# Patient Record
Sex: MALE | Employment: FULL TIME | URBAN - METROPOLITAN AREA
[De-identification: names, ages, dates, MRNs, and addresses within clinical notes are randomized per-mention and may not be internally consistent; named-entity substitution may affect disease eponyms.]

---

## 2018-04-09 ENCOUNTER — TRANSFERRED RECORDS (OUTPATIENT)
Dept: HEALTH INFORMATION MANAGEMENT | Facility: CLINIC | Age: 33
End: 2018-04-09

## 2018-04-18 ENCOUNTER — PRE VISIT (OUTPATIENT)
Dept: UROLOGY | Facility: CLINIC | Age: 33
End: 2018-04-18

## 2018-04-18 NOTE — TELEPHONE ENCOUNTER
MEDICAL RECORDS REQUEST   [facility]  Urology Clinic  [address] [place] [address]  PHONE: [phone]  Fax: [phone]        FUTURE VISIT INFORMATION                                                   Biju Chowdhury, : 1985 scheduled for future visit at [facility]    APPOINTMENT INFORMATION:    Date: 2018    Provider:  CORIN NAIR    Reason for Visit/Diagnosis: INFERTILITY    REFERRAL INFORMATION:    Referring provider:  [facility]    Specialty: UROLOGY    Referring providers clinic[facility]  [facility]    Clinic contact number:  [phone]    RECORDS REQUESTED FOR VISIT                                                     NOTES  STATUS/DETAILS   OFFICE NOTE from referring provider  in process   OFFICE NOTE from other specialist  in process   DISCHARGE SUMMARY from hospital  no   DISCHARGE REPORT from the ER  no   OPERATIVE REPORT  no   MEDICATION LIST  in process   INFERTILITY     ALBUMIN  in process   FSH  in process   LAST UROLOGY/OB GYN VISIT NOTE  in process   LH  in process   SEMEN ANALYSIS (LAST 2)  in process   SHBG  in process   T  in process       PRE-VISIT CHECKLIST      Record collection complete If no, please explain IN PROCESS   SENT REQUEST [facility] UROLOGY.. CD   Appointment appropriately scheduled           (right time/right provider) Yes   Joint diagnostic appointment coordinated correctly          (ensure right order & amount of time) Yes   MyChart activation If no, please explain IN PROCESS   Questionnaire complete If no, please explain IN PROCESS     Completed by: Chitra Hartman

## 2018-04-19 ENCOUNTER — APPOINTMENT (OUTPATIENT)
Dept: LAB | Facility: CLINIC | Age: 33
End: 2018-04-19
Payer: COMMERCIAL

## 2018-04-19 ENCOUNTER — OFFICE VISIT (OUTPATIENT)
Dept: UROLOGY | Facility: CLINIC | Age: 33
End: 2018-04-19
Payer: COMMERCIAL

## 2018-04-19 VITALS
SYSTOLIC BLOOD PRESSURE: 113 MMHG | HEART RATE: 57 BPM | WEIGHT: 165 LBS | HEIGHT: 72 IN | BODY MASS INDEX: 22.35 KG/M2 | DIASTOLIC BLOOD PRESSURE: 74 MMHG

## 2018-04-19 DIAGNOSIS — E29.1 TESTICULAR HYPOFUNCTION: ICD-10-CM

## 2018-04-19 DIAGNOSIS — I86.1 VARICOCELE: ICD-10-CM

## 2018-04-19 DIAGNOSIS — N46.11 OLIGOSPERMIA: Primary | ICD-10-CM

## 2018-04-19 LAB — FSH SERPL-ACNC: 3 IU/L (ref 0.7–10.8)

## 2018-04-19 ASSESSMENT — PAIN SCALES - GENERAL: PAINLEVEL: NO PAIN (0)

## 2018-04-19 NOTE — PROGRESS NOTES
It was my pleasure to see Mr. Biju Chowdhury, a 33 year old male here in consultation today for fertility evaluation.      HARSH Chowdhury is a 33 year old male with no significant PMH.  He and his partner have been attempting to conceive for the last year.  They have no previous pregnancy together.  They both have pregnacies with other partners reported.  They have tried timed intercourse for 1 year, including using BBT and OPK. They have not tried IUI or IVF.  They do not use lubrication during intercourse.  No associated conditions such as ED or sexual dysfunction.     The patient's spouse, Sylvie, is 39 years old.  She is in good health.  She has been pregnant before and has a 14 year old son.  She has regular monthly menstrual cycles.  She has been evaluated for infertility and has had a normal work-up other than elevated TH 5.9.  Her testing includes HSG, and thyroid, and ultrasound, which is scheduled.    PAST MEDICAL HISTORY:    No chronic medical problems     Puberty normal   No associated conditions such as ED or sexual dysfunction.  No  problems.     PAST SURG HISTORY  History reviewed. No pertinent surgical history.     Medications as of 4/19/2018:  No prescription medications     ALLERGY:   No Known Allergies    SOCIAL HISTORY:  . Occupation: HVAC  No alcohol abuse, Never tobacco use.   Social History   Substance Use Topics     Smoking status: Never Smoker     Smokeless tobacco: Never Used     Alcohol use Not on file         FAMILY HISTORY:   Mother and uncle have MS    REVIEW OF SYSTEMS:  Answers for HPI/ROS submitted by the patient on 4/19/2018   General Symptoms: No  Skin Symptoms: No  HENT Symptoms: No  EYE SYMPTOMS: No  HEART SYMPTOMS: No  LUNG SYMPTOMS: No  INTESTINAL SYMPTOMS: No  URINARY SYMPTOMS: No  REPRODUCTIVE SYMPTOMS: No  SKELETAL SYMPTOMS: No  BLOOD SYMPTOMS: No  NERVOUS SYSTEM SYMPTOMS: No  MENTAL HEALTH SYMPTOMS: No    Otherwise, no constitutional, eye, ENT, heart, lung,  GI , musculoskeletal, skin, neurologic, psychiatric, or hematologic complaints.    GONADOTOXIN EXPOSURE: Unremarkable. Otherwise negative for marijuana, heat, chemicals, pesticides, heavy metals, steroids, chemotherapy or radiation.    GENERAL PHYSICAL EXAM  /74  Pulse 57  Ht 1.829 m (6')  Wt 74.8 kg (165 lb)  BMI 22.38 kg/m2   Constitutional: No acute distress. Well nourished.   PSYCH: normal mood and affect.  NEURO: normal gait, no focal deficits.   EYES: anicteric, EOMI, PERR  ENT: neck supple,  mucosae moist, no thrush.  CARDIOPULMONARY: breathing non-labored, pulse regular, no peripheral edema.  GI: Abdomen soft, non-tender, no surgical scars, no organomegaly.  MUSCULOSKELETAL: normal limb proportions, no muscle wasting, no contractures.  SKIN: Normal virilized hair distribution, no lesions, warts or rashes over genitalia, abdomen extremities or face.  HEME/LYMPH: no ecchymosis, no lymphadenopathy in groin or neck, no lymphedema.     EXAM:  Phallus circumcised, meatus adequate, no plaques palpated.   Left testis descended , size is 16-18 cc , consistency is normal . No intra-testicular masses.   Right testis descended , size is 16-18 cc , consistency is normal . No intra-testicular masses.   Epididymes present, non-tender, not enlarged.   Left cord: Vas present. Grade II  varicocele noted.  Right cord: Vas present. Probable small Grade II  varicocele noted.     Rectal exam deferred.     [facility]:  [facility]  Semen Analysis:  (normal range in parenthesis)   -Volume: 4.1 ml (1.5-5.0)   -pH: 7.6 (>7.2)   -Concentration: 6.4 million/ml (>15 million/ml)   -% Forward progressive: 60% (>30%)   -Total progressive motile count: 16.3million (>15.6 million)   -% Normal morphology: 0% (>4%)      ASSESSMENT:    Testicular hypofunction: Oligospermia, teratospermia.  Left varicocele, probable right varicocele.    PLAN:    Hormonal panel today.    Repeat SA     Scrotal ultrasound to confirm right varicocele or  not.  Discussed risks, benefits, and alternatives of varix repair, including various methods.    I counseled him extensively on the nature of varicoceles, and their possible effects on testosterone production and fertility.  I described surgery and embolization approaches, and the detailed risks of surgical repair.  These include damage to artery (ischemia), damage to lymphatics ( hydrocele), as well as risk of recurrence (</= 1%). Discussed that about 2/3rds of men see improved semen parameters and that improvement takes at least 3 months to see.        Discussed OTC supplements to consider taking    Dr. Mcbride will contact patient with results and plan/options    Patient was seen and examined with Dr. Reed Munoz, G2  Urology Resident    CC: Self    I saw and examined the patient with the resident today.  I agree with the resident note and plan of care as above.     Tez Mcbride MD  Urology Staff

## 2018-04-19 NOTE — NURSING NOTE
Chief Complaint   Patient presents with     Consult     fertility testing follow up       Blood pressure 113/74, pulse 57, height 1.829 m (6'), weight 74.8 kg (165 lb). Body mass index is 22.38 kg/(m^2).    There is no problem list on file for this patient.      No Known Allergies    No current outpatient prescriptions on file.       Social History   Substance Use Topics     Smoking status: Never Smoker     Smokeless tobacco: Never Used     Alcohol use No       Ninfa Lo LPN  4/19/2018  8:03 AM

## 2018-04-19 NOTE — PATIENT INSTRUCTIONS
Semen analysis.  Blood work today.  Schedule imaging.        It was a pleasure meeting with you today.  Thank you for allowing me and my team the privilege of caring for you today.  YOU are the reason we are here, and I truly hope we provided you with the excellent service you deserve.  Please let us know if there is anything else we can do for you so that we can be sure you are leaving completely satisfied with your care experience.

## 2018-04-19 NOTE — LETTER
4/19/2018       RE: Biju Chowdhury  [address] [place] [address]     Dear Colleague,    Thank you for referring your patient, Biju Chowdhury, to the [facility]OLOGY AND [facility]ST FOR PROSTATE AND UROLOGIC CANCERS at [facility]. Please see a copy of my visit note below.    It was my pleasure to see Mr. Biju Chowdhury, a 33 year old male here in consultation today for fertility evaluation.      HPI  Biju Chowdhury is a 33 year old male with no significant PMH.  He and his partner have been attempting to conceive for the last year.  They have no previous pregnancy together.  They both have pregnacies with other partners reported.  They have tried timed intercourse for 1 year, including using BBT and OPK. They have not tried IUI or IVF.  They do not use lubrication during intercourse.  No associated conditions such as ED or sexual dysfunction.     The patient's spouse, Sylvie, is 39 years old.  She is in good health.  She has been pregnant before and has a 14 year old son.  She has regular monthly menstrual cycles.  She has been evaluated for infertility and has had a normal work-up other than elevated TH 5.9.  Her testing includes HSG, and thyroid, and ultrasound, which is scheduled.    PAST MEDICAL HISTORY:    No chronic medical problems     Puberty normal   No associated conditions such as ED or sexual dysfunction.  No  problems.     PAST SURG HISTORY  History reviewed. No pertinent surgical history.     Medications as of 4/19/2018:  No prescription medications     ALLERGY:   No Known Allergies    SOCIAL HISTORY:  . Occupation: HVAC  No alcohol abuse, Never tobacco use.   Social History   Substance Use Topics     Smoking status: Never Smoker     Smokeless tobacco: Never Used     Alcohol use Not on file         FAMILY HISTORY:   Mother and uncle have MS    REVIEW OF SYSTEMS:    Otherwise, no constitutional, eye, ENT, heart, lung, GI , musculoskeletal, skin,  neurologic, psychiatric, or hematologic complaints.    GONADOTOXIN EXPOSURE: Unremarkable. Otherwise negative for marijuana, heat, chemicals, pesticides, heavy metals, steroids, chemotherapy or radiation.    GENERAL PHYSICAL EXAM  /74  Pulse 57  Ht 1.829 m (6')  Wt 74.8 kg (165 lb)  BMI 22.38 kg/m2   Constitutional: No acute distress. Well nourished.   PSYCH: normal mood and affect.  NEURO: normal gait, no focal deficits.   EYES: anicteric, EOMI, PERR  ENT: neck supple,  mucosae moist, no thrush.  CARDIOPULMONARY: breathing non-labored, pulse regular, no peripheral edema.  GI: Abdomen soft, non-tender, no surgical scars, no organomegaly.  MUSCULOSKELETAL: normal limb proportions, no muscle wasting, no contractures.  SKIN: Normal virilized hair distribution, no lesions, warts or rashes over genitalia, abdomen extremities or face.  HEME/LYMPH: no ecchymosis, no lymphadenopathy in groin or neck, no lymphedema.     EXAM:  Phallus circumcised, meatus adequate, no plaques palpated.   Left testis descended , size is 16-18 cc , consistency is normal . No intra-testicular masses.   Right testis descended , size is 16-18 cc , consistency is normal . No intra-testicular masses.   Epididymes present, non-tender, not enlarged.   Left cord: Vas present. Grade II  varicocele noted.  Right cord: Vas present. Probable small Grade II  varicocele noted.     Rectal exam deferred.     [facility]  [facility]  Semen Analysis:  (normal range in parenthesis)   -Volume: 4.1 ml (1.5-5.0)   -pH: 7.6 (>7.2)   -Concentration: 6.4 million/ml (>15 million/ml)   -% Forward progressive: 60% (>30%)   -Total progressive motile count: 16.3million (>15.6 million)   -% Normal morphology: 0% (>4%)      ASSESSMENT:    Testicular hypofunction: Oligospermia, teratospermia.  Left varicocele, probable right varicocele.    PLAN:    Hormonal panel today.    Repeat SA     Scrotal ultrasound to confirm right varicocele or not.  Discussed risks,  benefits, and alternatives of varix repair, including various methods.    I counseled him extensively on the nature of varicoceles, and their possible effects on testosterone production and fertility.  I described surgery and embolization approaches, and the detailed risks of surgical repair.  These include damage to artery (ischemia), damage to lymphatics ( hydrocele), as well as risk of recurrence (</= 1%). Discussed that about 2/3rds of men see improved semen parameters and that improvement takes at least 3 months to see.        Discussed OTC supplements to consider taking    Dr. Mcbride will contact patient with results and plan/options    Patient was seen and examined with Dr. Reed Munoz, G2  Urology Resident    I saw and examined the patient with the resident today.  I agree with the resident note and plan of care as above.     Again, thank you for allowing me to participate in the care of your patient.      Sincerely,    Tez Mcbride MD    CC: Self

## 2018-04-19 NOTE — MR AVS SNAPSHOT
After Visit Summary   4/19/2018    Biju Chowdhury    MRN: [id number]           Patient Information     Date Of Birth          1985        Visit Information        Provider Department      4/19/2018 7:40 AM Tez Mcbride MD [facility]        Today's Diagnoses     Oligospermia    -  1    Testicular hypofunction          Care Instructions    Semen analysis.  Blood work today.  Schedule imaging.        It was a pleasure meeting with you today.  Thank you for allowing me and my team the privilege of caring for you today.  YOU are the reason we are here, and I truly hope we provided you with the excellent service you deserve.  Please let us know if there is anything else we can do for you so that we can be sure you are leaving completely satisfied with your care experience.                  Follow-ups after your visit        Your next 10 appointments already scheduled     Apr 19, 2018  9:15 AM CDT   LAB with [facility] ([facility])    [address] [place] [id number] [phone]           Please do not eat 10-12 hours before your appointment if you are coming in fasting for labs on lipids, cholesterol, or glucose (sugar). This does not apply to pregnant women. Water, hot tea and black coffee (with nothing added) are okay. Do not drink other fluids, diet soda or chew gum.              Future tests that were ordered for you today     Open Future Orders        Priority Expected Expires Ordered    US Testicular and Scrotum Routine  4/19/2019 4/19/2018    Semen Analysis, Strict Morphology (KATHLEEN) Routine  4/20/2019 4/19/2018            Who to contact     Please call your clinic at [phone] to:    Ask questions about your health    Make or cancel appointments    Discuss your medicines    Learn about your test results    Speak to your doctor            Additional Information About Your  Visit        [employer] Information     [employer] is an electronic gateway that provides easy, online access to your medical records. With [employer], you can request a clinic appointment, read your test results, renew a prescription or communicate with your care team.     To sign up for [employer] visit the website at [email or web address]   You will be asked to enter the access code listed below, as well as some personal information. Please follow the directions to create your username and password.     Your access code is: [email or web address]  Expires: 2018  6:30 AM     Your access code will  in 90 days. If you need help or a new code, please contact your [facility] or call [phone] for assistance.        Care EveryWhere ID     This is your Care EveryWhere ID. This could be used by other organizations to access your [facility] medical records  [id number]        Your Vitals Were     Pulse Height BMI (Body Mass Index)             57 1.829 m (6') 22.38 kg/m2          Blood Pressure from Last 3 Encounters:   18 113/74    Weight from Last 3 Encounters:   18 74.8 kg (165 lb)              We Performed the Following     Estradiol ultrasensitive     Follicle stimulating hormone     Testosterone Free and Total        Primary Care Provider Fax #    Physician No Ref-Primary [phone]       No address on file        Equal Access to Services     FREDA MARQUIS : Hadii gerald mcfarlaneo Sojeremiah, waaxda luqadaha, qaybta kaalmada adeegyada, kurt charles . So [facility] [phone].    ATENCIÓN: Si habla español, tiene a covarrubias disposición servicios gratuitos de asistencia lingüística. Llame al [phone].    We comply with applicable federal civil rights laws and [place] laws. We do not discriminate on the basis of race, color, national origin, age, disability, sex, sexual orientation, or gender identity.            Thank you!     Thank you for choosing [facility] PR[facility]ATE AND UROLOGIC CANCERS  for your care. Our goal is always to provide you with excellent care. Hearing back from our patients is one way we can continue to improve our services. Please take a few minutes to complete the written survey that you may receive in the mail after your visit with us. Thank you!             Your Updated Medication List - Protect others around you: Learn how to safely use, store and throw away your medicines at www.disposemymeds.org.      Notice  As of 4/19/2018  8:45 AM    You have not been prescribed any medications.

## 2018-04-19 NOTE — NURSING NOTE
Chief Complaint   Patient presents with     Consult     fertility testing follow up       Blood pressure 113/74, pulse 57, height 1.829 m (6'), weight 74.8 kg (165 lb). Body mass index is 22.38 kg/(m^2).    There is no problem list on file for this patient.      No Known Allergies    No current outpatient prescriptions on file.       Social History   Substance Use Topics     Smoking status: Never Smoker     Smokeless tobacco: Never Used     Alcohol use Not on file       Ninfa Lo LPN  4/19/2018  7:56 AM

## 2018-04-20 LAB
SHBG SERPL-SCNC: 50 NMOL/L (ref 11–80)
TESTOST FREE SERPL-MCNC: 10.07 NG/DL (ref 4.7–24.4)
TESTOST SERPL-MCNC: 610 NG/DL (ref 240–950)

## 2018-04-24 LAB — ESTRADIOL SERPL HS-MCNC: 25 PG/ML (ref 10–40)

## 2020-08-12 ENCOUNTER — TELEPHONE (OUTPATIENT)
Dept: UROLOGY | Facility: CLINIC | Age: 35
End: 2020-08-12

## 2020-08-12 NOTE — TELEPHONE ENCOUNTER
[facility] Call Center    Phone Message    May a detailed message be left on voicemail: yes     Reason for Call: Other: pt wife Sylvie calling to see what is the steps needed for fertility? They are ready for surgery and would like to discuss the recommendations from Dr Mcbride again. Please advise.      Action Taken: Message routed to:  [facility] urology    Travel Screening: Not Applicable

## 2020-08-14 NOTE — TELEPHONE ENCOUNTER
Spoke with wife and they wanted to proceed with what was recommended two years ago. I let them know the Dr wants to have a visit since it has been two years. Phone call scheduled for 9/17 9:15am

## 2020-09-10 ENCOUNTER — PRE VISIT (OUTPATIENT)
Dept: UROLOGY | Facility: CLINIC | Age: 35
End: 2020-09-10

## 2020-09-10 NOTE — TELEPHONE ENCOUNTER
Reason for Visit: Follow up discuss options, last seen 2018    Diagnosis: Oligospermia    Orders/Procedures/Records: in system    Contact Patient: n/a    Rooming Requirements: normal      Ninfa Lo LPN  09/10/20  8:48 AM

## 2020-09-17 ENCOUNTER — VIRTUAL VISIT (OUTPATIENT)
Dept: UROLOGY | Facility: CLINIC | Age: 35
End: 2020-09-17
Payer: COMMERCIAL

## 2020-09-17 DIAGNOSIS — N46.11 OLIGOSPERMIA: Primary | ICD-10-CM

## 2020-09-17 NOTE — PROGRESS NOTES
"Biju Chowdhury is a 35 year old male who is being evaluated via a billable telephone visit.      The patient has been notified of following:     \"This telephone visit will be conducted via a call between you and your physician/provider. We have found that certain health care needs can be provided without the need for a physical exam.  This service lets us provide the care you need with a short phone conversation.  If a prescription is necessary we can send it directly to your pharmacy.  If lab work is needed we can place an order for that and you can then stop by our lab to have the test done at a later time.    Telephone visits are billed at different rates depending on your insurance coverage. During this emergency period, for some insurers they may be billed the same as an in-person visit.  Please reach out to your insurance provider with any questions.    If during the course of the call the physician/provider feels a telephone visit is not appropriate, you will not be charged for this service.\"    Patient has given verbal consent for Telephone visit?  Yes    What phone number would you like to be contacted at? [phone]    How would you like to obtain your AVS? Mail a copy    Phone call duration: 18 minutes, ending 9:38 AM         Last seen 2.5 years ago.  Discussed scrotal ultrasound and possible varicocele repair at this time.  He tried natural supplements since I last saw him.  About 1yr ago started over-the-counter MVI but no consistently.  Finances are a concern.    Component      Latest Ref Rng & Units 4/19/2018   Testosterone Total      240 - 950 ng/dL 610   Sex Hormone Binding Globulin      11 - 80 nmol/L 50   Free Testosterone Calculated      4.7 - 24.4 ng/dL 10.07   FSH      0.7 - 10.8 IU/L 3.0   Estradiol Ultrasensitive      10 - 40 pg/mL 25     His partner Sylvie is 41/42 years old.      Discussed that female fertility is suspect due to her advancing age.  Sounds like they did 1-2 IUIs in the " past.    Discussed that varicocele repair would be the main fixable thing on his side of things.  I would want to a scrotal ultrasound to determine right varicocele or not, prior to treatment.  He would like to know cost- I said it depends if left or bilateral varicocele.  Embolization is more expensive.  They are saving money in a cookie jar to go towards fertility treatment at this time.    He understands her age is going to be an increasing factor with time.    [facility]  Semen Analysis:  (normal range in parenthesis)               -Volume: 4.1 ml (1.5-5.0)               -pH: 7.6 (>7.2)               -Concentration: 6.4 million/ml (>15 million/ml)               -% Forward progressive: 60% (>30%)               -Total progressive motile count: 16.3million (>15.6 million)               -% Normal morphology: 0% (>4%)    A-  Left and possible right varicocele.  Normal hormone profile 2018.  Oligospermia     Plan  -  Recommended scrotal ultrasound as a next step.  - he will think about the options and let us know when he wants to proceed.

## 2020-09-17 NOTE — NURSING NOTE
Chief Complaint   Patient presents with     Follow Up     Oligospermia     There is no problem list on file for this patient.      No Known Allergies    No current outpatient medications on file.       Social History     Tobacco Use     Smoking status: Never Smoker     Smokeless tobacco: Never Used   Substance Use Topics     Alcohol use: No     Drug use: No       Ninfa Lo LPN  9/17/2020  8:43 AM

## 2020-09-17 NOTE — LETTER
"9/17/2020       RE: Biju Chowdhury  [address] [place] [address]     Dear Colleague,    Thank you for referring your patient, Biju Chowdhury, to the [facility] UROLOGY AND INST FOR PROSTATE AND UROLOGIC CANCERS at [facility]. Please see a copy of my visit note below.    Biju Chowdhury is a 35 year old male who is being evaluated via a billable telephone visit.      The patient has been notified of following:     \"This telephone visit will be conducted via a call between you and your physician/provider. We have found that certain health care needs can be provided without the need for a physical exam.  This service lets us provide the care you need with a short phone conversation.  If a prescription is necessary we can send it directly to your pharmacy.  If lab work is needed we can place an order for that and you can then stop by our lab to have the test done at a later time.    Telephone visits are billed at different rates depending on your insurance coverage. During this emergency period, for some insurers they may be billed the same as an in-person visit.  Please reach out to your insurance provider with any questions.    If during the course of the call the physician/provider feels a telephone visit is not appropriate, you will not be charged for this service.\"    Patient has given verbal consent for Telephone visit?  Yes    What phone number would you like to be contacted at? [phone]    How would you like to obtain your AVS? Mail a copy    Phone call duration: 18 minutes, ending 9:38 AM         Last seen 2.5 years ago.  Discussed scrotal ultrasound and possible varicocele repair at this time.  He tried natural supplements since I last saw him.  About 1yr ago started over-the-counter MVI but no consistently.  Finances are a concern.    Component      Latest Ref Rng & Units 4/19/2018   Testosterone Total      240 - 950 ng/dL 610   Sex Hormone Binding Globulin      " 11 - 80 nmol/L 50   Free Testosterone Calculated      4.7 - 24.4 ng/dL 10.07   FSH      0.7 - 10.8 IU/L 3.0   Estradiol Ultrasensitive      10 - 40 pg/mL 25     His partner Sylvie is 41/42 years old.      Discussed that female fertility is suspect due to her advancing age.  Sounds like they did 1-2 IUIs in the past.    Discussed that varicocele repair would be the main fixable thing on his side of things.  I would want to a scrotal ultrasound to determine right varicocele or not, prior to treatment.  He would like to know cost- I said it depends if left or bilateral varicocele.  Embolization is more expensive.  They are saving money in a cookie jar to go towards fertility treatment at this time.    He understands her age is going to be an increasing factor with time.    [facility]  Semen Analysis:  (normal range in parenthesis)               -Volume: 4.1 ml (1.5-5.0)               -pH: 7.6 (>7.2)               -Concentration: 6.4 million/ml (>15 million/ml)               -% Forward progressive: 60% (>30%)               -Total progressive motile count: 16.3million (>15.6 million)               -% Normal morphology: 0% (>4%)    A-  Left and possible right varicocele.  Normal hormone profile 2018.  Oligospermia     Plan  -  Recommended scrotal ultrasound as a next step.  - he will think about the options and let us know when he wants to proceed.            Again, thank you for allowing me to participate in the care of your patient.      Sincerely,    Tez Mcbride MD

## 2020-09-17 NOTE — LETTER
Date:September 18, 2020      Provider requested that no letter be sent. Do not send.       [facility] Health Information

## 2020-09-28 ENCOUNTER — TELEPHONE (OUTPATIENT)
Dept: UROLOGY | Facility: CLINIC | Age: 35
End: 2020-09-28

## 2020-09-28 DIAGNOSIS — I86.1 VARICOCELE: Primary | ICD-10-CM

## 2020-09-28 NOTE — TELEPHONE ENCOUNTER
Left message to let the patient and wife know what the procedure code is for the procedure 66856. If patient is going to have surgery patient would need to let us know due to he is not scheduled yet. Patient also needs ultrasound scheduled     Jenn Morris RN   Care Coordinator Urology

## 2020-09-28 NOTE — TELEPHONE ENCOUNTER
----- Message from Jenn Morris RN sent at 9/28/2020 12:16 PM CDT -----  Regarding: FW: procedure  Please call patient to set up a scrotal ultrasound. Ordered at the video visit   thanks  ----- Message -----  From: Tez Mcbride MD  Sent: 9/25/2020   1:12 PM CDT  To: Jenn Morris RN, Allie Shipley  Subject: RE: procedure                                    He would be for a left, possible right varicocele repair [id number].    He needs a scrotal ultrasound to help decide if surgery would be left or bilateral.    Thank You  Jacquie WEN  ----- Message -----  From: Allie Shipley  Sent: 9/25/2020   1:07 PM CDT  To: Tez Mcbride MD, Jenn Morris RN  Subject: procedure                                        Jenn/Dr. Mcbride,    I am receiving a phone call from the patient above spouse asking for CPT codes. Can you please tell me what he is going to be scheduled for. Thanks

## 2020-09-28 NOTE — TELEPHONE ENCOUNTER
----- Message from Tez Mcbride MD sent at 9/25/2020  1:12 PM CDT -----  Regarding: RE: procedure  He would be for a left, possible right varicocele repair [id number].    He needs a scrotal ultrasound to help decide if surgery would be left or bilateral.    Thank You  Jacquie WEN  ----- Message -----  From: ViolettaGuillermo edwardki  Sent: 9/25/2020   1:07 PM CDT  To: Tez Mcbride MD, Jenn Morris RN  Subject: procedure                                        Jenn/Dr. Mcbride,    I am receiving a phone call from the patient above spouse asking for CPT codes. Can you please tell me what he is going to be scheduled for. Thanks

## 2020-10-21 ENCOUNTER — PATIENT OUTREACH (OUTPATIENT)
Dept: UROLOGY | Facility: CLINIC | Age: 35
End: 2020-10-21

## 2020-10-26 NOTE — TELEPHONE ENCOUNTER
----- Message from Jenn Morris RN sent at 9/28/2020 12:17 PM CDT -----  Regarding: FW: procedure  Did he get the Ultrasound?  ----- Message -----  From: Tez Mcbride MD  Sent: 9/25/2020   1:12 PM CDT  To: Jenn Morris RN, Allie Shipley  Subject: RE: procedure                                    He would be for a left, possible right varicocele repair [id number].    He needs a scrotal ultrasound to help decide if surgery would be left or bilateral.    Thank You  Jacquie WEN  ----- Message -----  From: Allie Shipley  Sent: 9/25/2020   1:07 PM CDT  To: Tez Mcbride MD, Jenn Morris RN  Subject: procedure                                        Jenn/Dr. Mcbride,    I am receiving a phone call from the patient above spouse asking for CPT codes. Can you please tell me what he is going to be scheduled for. Thanks

## 2020-10-26 NOTE — TELEPHONE ENCOUNTER
Letter sent to the patient asking to call to schedule ultrasound of his scrotum.    Jenn Morris, MITA   Care Coordinator Urology

## 2022-09-14 ENCOUNTER — TELEPHONE (OUTPATIENT)
Dept: ENDOCRINOLOGY | Facility: CLINIC | Age: 37
End: 2022-09-14

## 2022-09-14 ENCOUNTER — HOSPITAL ENCOUNTER (EMERGENCY)
Facility: HOSPITAL | Age: 37
Discharge: HOME | End: 2022-09-14
Attending: EMERGENCY MEDICINE | Admitting: EMERGENCY MEDICINE
Payer: COMMERCIAL

## 2022-09-14 ENCOUNTER — APPOINTMENT (OUTPATIENT)
Dept: CT IMAGING | Facility: HOSPITAL | Age: 37
End: 2022-09-14
Attending: EMERGENCY MEDICINE
Payer: COMMERCIAL

## 2022-09-14 VITALS
SYSTOLIC BLOOD PRESSURE: 139 MMHG | DIASTOLIC BLOOD PRESSURE: 86 MMHG | HEIGHT: 71 IN | TEMPERATURE: 98.4 F | WEIGHT: 149.91 LBS | BODY MASS INDEX: 20.99 KG/M2 | OXYGEN SATURATION: 99 % | HEART RATE: 120 BPM | RESPIRATION RATE: 18 BRPM

## 2022-09-14 DIAGNOSIS — E05.90 HYPERTHYROIDISM: ICD-10-CM

## 2022-09-14 LAB
ALBUMIN SERPL-MCNC: 3.7 G/DL (ref 3.5–5)
ALP SERPL-CCNC: 97 U/L (ref 45–120)
ALT SERPL W P-5'-P-CCNC: 110 U/L (ref 0–45)
AMPHETAMINES UR QL SCN: NORMAL
ANION GAP SERPL CALCULATED.3IONS-SCNC: 10 MMOL/L (ref 5–18)
AST SERPL W P-5'-P-CCNC: 46 U/L (ref 0–40)
BARBITURATES UR QL: NORMAL
BASOPHILS # BLD AUTO: 0 10E3/UL (ref 0–0.2)
BASOPHILS NFR BLD AUTO: 1 %
BENZODIAZ UR QL: NORMAL
BILIRUB SERPL-MCNC: 0.4 MG/DL (ref 0–1)
BUN SERPL-MCNC: 17 MG/DL (ref 8–22)
C REACTIVE PROTEIN LHE: 0.1 MG/DL (ref 0–?)
CALCIUM SERPL-MCNC: 10.4 MG/DL (ref 8.5–10.5)
CANNABINOIDS UR QL SCN: NORMAL
CHLORIDE BLD-SCNC: 104 MMOL/L (ref 98–107)
CO2 SERPL-SCNC: 25 MMOL/L (ref 22–31)
COCAINE UR QL: NORMAL
CREAT SERPL-MCNC: 1.04 MG/DL (ref 0.7–1.3)
CREAT UR-MCNC: 234 MG/DL
EOSINOPHIL # BLD AUTO: 0.1 10E3/UL (ref 0–0.7)
EOSINOPHIL NFR BLD AUTO: 2 %
ERYTHROCYTE [DISTWIDTH] IN BLOOD BY AUTOMATED COUNT: 13.2 % (ref 10–15)
ETHANOL SERPL-MCNC: <10 MG/DL
FLUAV RNA SPEC QL NAA+PROBE: NEGATIVE
FLUBV RNA RESP QL NAA+PROBE: NEGATIVE
GFR SERPL CREATININE-BSD FRML MDRD: >90 ML/MIN/1.73M2
GLUCOSE BLD-MCNC: 107 MG/DL (ref 70–125)
HCT VFR BLD AUTO: 46.1 % (ref 40–53)
HGB BLD-MCNC: 15 G/DL (ref 13.3–17.7)
IMM GRANULOCYTES # BLD: 0 10E3/UL
IMM GRANULOCYTES NFR BLD: 0 %
LIPASE SERPL-CCNC: 41 U/L (ref 0–52)
LYMPHOCYTES # BLD AUTO: 1.2 10E3/UL (ref 0.8–5.3)
LYMPHOCYTES NFR BLD AUTO: 29 %
MAGNESIUM SERPL-MCNC: 1.8 MG/DL (ref 1.8–2.6)
MCH RBC QN AUTO: 25.5 PG (ref 26.5–33)
MCHC RBC AUTO-ENTMCNC: 32.5 G/DL (ref 31.5–36.5)
MCV RBC AUTO: 78 FL (ref 78–100)
METHADONE UR QL SCN: NORMAL
MONOCYTES # BLD AUTO: 0.6 10E3/UL (ref 0–1.3)
MONOCYTES NFR BLD AUTO: 15 %
NEUTROPHILS # BLD AUTO: 2.3 10E3/UL (ref 1.6–8.3)
NEUTROPHILS NFR BLD AUTO: 53 %
NRBC # BLD AUTO: 0 10E3/UL
NRBC BLD AUTO-RTO: 0 /100
OPIATES UR QL SCN: NORMAL
OXYCODONE UR QL: NORMAL
PCP UR QL SCN: NORMAL
PLATELET # BLD AUTO: 312 10E3/UL (ref 150–450)
POTASSIUM BLD-SCNC: 4.1 MMOL/L (ref 3.5–5)
PROT SERPL-MCNC: 7.3 G/DL (ref 6–8)
RBC # BLD AUTO: 5.89 10E6/UL (ref 4.4–5.9)
RSV RNA SPEC NAA+PROBE: NEGATIVE
SARS-COV-2 RNA RESP QL NAA+PROBE: NEGATIVE
SODIUM SERPL-SCNC: 139 MMOL/L (ref 136–145)
TROPONIN I SERPL-MCNC: 0.01 NG/ML (ref 0–0.29)
TSH SERPL DL<=0.005 MIU/L-ACNC: <0.01 UIU/ML (ref 0.3–5)
WBC # BLD AUTO: 4.2 10E3/UL (ref 4–11)

## 2022-09-14 PROCEDURE — 80307 DRUG TEST PRSMV CHEM ANLYZR: CPT | Performed by: EMERGENCY MEDICINE

## 2022-09-14 PROCEDURE — 84443 ASSAY THYROID STIM HORMONE: CPT | Performed by: EMERGENCY MEDICINE

## 2022-09-14 PROCEDURE — 87637 SARSCOV2&INF A&B&RSV AMP PRB: CPT | Performed by: EMERGENCY MEDICINE

## 2022-09-14 PROCEDURE — 36415 COLL VENOUS BLD VENIPUNCTURE: CPT | Performed by: EMERGENCY MEDICINE

## 2022-09-14 PROCEDURE — 84445 ASSAY OF TSI GLOBULIN: CPT | Performed by: EMERGENCY MEDICINE

## 2022-09-14 PROCEDURE — 84439 ASSAY OF FREE THYROXINE: CPT | Performed by: EMERGENCY MEDICINE

## 2022-09-14 PROCEDURE — 80053 COMPREHEN METABOLIC PANEL: CPT | Performed by: EMERGENCY MEDICINE

## 2022-09-14 PROCEDURE — 84484 ASSAY OF TROPONIN QUANT: CPT | Performed by: EMERGENCY MEDICINE

## 2022-09-14 PROCEDURE — 96361 HYDRATE IV INFUSION ADD-ON: CPT | Performed by: EMERGENCY MEDICINE

## 2022-09-14 PROCEDURE — 99285 EMERGENCY DEPT VISIT HI MDM: CPT | Mod: 25 | Performed by: EMERGENCY MEDICINE

## 2022-09-14 PROCEDURE — 85014 HEMATOCRIT: CPT | Performed by: EMERGENCY MEDICINE

## 2022-09-14 PROCEDURE — 250N000011 HC RX IP 250 OP 636: Performed by: EMERGENCY MEDICINE

## 2022-09-14 PROCEDURE — C9803 HOPD COVID-19 SPEC COLLECT: HCPCS | Performed by: EMERGENCY MEDICINE

## 2022-09-14 PROCEDURE — 71275 CT ANGIOGRAPHY CHEST: CPT

## 2022-09-14 PROCEDURE — 83690 ASSAY OF LIPASE: CPT | Performed by: EMERGENCY MEDICINE

## 2022-09-14 PROCEDURE — 93005 ELECTROCARDIOGRAM TRACING: CPT | Performed by: EMERGENCY MEDICINE

## 2022-09-14 PROCEDURE — 86140 C-REACTIVE PROTEIN: CPT | Performed by: EMERGENCY MEDICINE

## 2022-09-14 PROCEDURE — 96374 THER/PROPH/DIAG INJ IV PUSH: CPT | Mod: 59 | Performed by: EMERGENCY MEDICINE

## 2022-09-14 PROCEDURE — 258N000003 HC RX IP 258 OP 636: Performed by: EMERGENCY MEDICINE

## 2022-09-14 PROCEDURE — 82077 ASSAY SPEC XCP UR&BREATH IA: CPT | Performed by: EMERGENCY MEDICINE

## 2022-09-14 PROCEDURE — 83735 ASSAY OF MAGNESIUM: CPT | Performed by: EMERGENCY MEDICINE

## 2022-09-14 RX ORDER — PROPRANOLOL HYDROCHLORIDE 60 MG/1
60 TABLET ORAL 2 TIMES DAILY
Qty: 60 TABLET | Refills: 0 | Status: SHIPPED | OUTPATIENT
Start: 2022-09-14 | End: 2022-09-26

## 2022-09-14 RX ORDER — IOPAMIDOL 755 MG/ML
100 INJECTION, SOLUTION INTRAVASCULAR ONCE
Status: COMPLETED | OUTPATIENT
Start: 2022-09-14 | End: 2022-09-14

## 2022-09-14 RX ORDER — PROPRANOLOL HYDROCHLORIDE 1 MG/ML
1 INJECTION, SOLUTION INTRAVENOUS ONCE
Status: COMPLETED | OUTPATIENT
Start: 2022-09-14 | End: 2022-09-14

## 2022-09-14 RX ADMIN — SODIUM CHLORIDE 1000 ML: 9 INJECTION, SOLUTION INTRAVENOUS at 16:42

## 2022-09-14 RX ADMIN — IOPAMIDOL 100 ML: 755 INJECTION, SOLUTION INTRAVENOUS at 19:43

## 2022-09-14 RX ADMIN — PROPRANOLOL HYDROCHLORIDE 1 MG: 1 INJECTION INTRAVENOUS at 21:05

## 2022-09-14 ASSESSMENT — ENCOUNTER SYMPTOMS
PALPITATIONS: 1
VOMITING: 0
SHORTNESS OF BREATH: 1
NAUSEA: 0
FATIGUE: 1
COUGH: 0

## 2022-09-14 ASSESSMENT — ACTIVITIES OF DAILY LIVING (ADL)
ADLS_ACUITY_SCORE: 35
ADLS_ACUITY_SCORE: 35

## 2022-09-14 NOTE — ED PROVIDER NOTES
EMERGENCY DEPARTMENT ENCOUNTER      NAME: Biju Chowdhury  AGE: 37 year old male  YOB: 1985  MRN: [id number]  EVALUATION DATE & TIME: 2022  5:12 PM    PCP: No Ref-Primary, Physician    ED PROVIDER: Ilir Garvey DO      Chief Complaint   Patient presents with     Shortness of Breath     Fatigue         FINAL IMPRESSION:  1. Hyperthyroidism          ED COURSE & MEDICAL DECISION MAKIN-year-old male presented to the ED for evaluation of exertional dyspnea and palpitations that been ongoing for over 1 month.  The patient indicated that the episodes seem to be getting worse.  The patient denies any associated chest pain or chest pressure, however.  He also reported increased stress recently secondary to the death of his father and sister within the last 2 months.  Here in the ED the patient was noted to be tachycardic upon arrival but he was otherwise hemodynamically stable.  The patient was fatigued appearing but he did not appear to be in acute distress at the time of his initial evaluation.  On exam the patient was noted to have tachycardia but the remainder of his physical exam was unremarkable.    An EKG was obtained which revealed sinus tachycardia with ST segment elevations in the anterior leads with T wave inversions noted in the inferior and lateral leads.  There were no previous EKGs available for comparison.    CBC, BMP, hepatic panel, magnesium, and troponin were all reassuring.  Testing for COVID and influenza were both negative.     The patient's TSH was less than 0.01.  The patient did note that he was told he had thyroid issues based upon a routine physical exam lab work a few months ago.  Based upon the TSH result and the patient's symptoms it seems likely that the patient has hyperthyroidism.  However, he does not appear to be experiencing a thyroid storm at this time.  Outside laboratory tests from 2022 were reviewed.  The patient was noted to have an elevated free T4 and  T3 at that time.    CT scan of the chest was nondiagnostic.    The patient was reevaluated and informed of the reassuring lab and chest CT scan results.  The patient was informed that his symptoms appear consistent with hyperthyroidism.  The patient was given a dose of IV propranolol to treat his persistent tachycardia.  The patient stated that his heart rate has been in the 120s even at rest for the last month.     The patient's case and laboratory results were then discussed with the on-call endocrinologist.  Methimazole was not recommended because of the patient's slightly elevated LFTs. Dr. Aragon recommended starting the patient on propranolol 60 mg twice daily and then having him follow-up with endocrinology as an outpatient.    The patient was informed of the endocrinologist recommendations.  The patient and his wife felt comfortable returning home.  The patient was sent home with propranolol to take twice a day.  A referral was placed to endocrinology at the [facility].  The patient was instructed to return back to the ED for any worsening palpitations, dizziness, chest pain, shortness of breath, or any other new or concerning symptoms    Pertinent Labs & Imaging studies reviewed. (See chart for details)    5:19 PM I met with the patient to gather history and to perform my initial exam. We discussed plans for the ED course, including diagnostic testing and treatment.   8:19 PM Spoke with Dr. Aragon [facility].       At the conclusion of the encounter I discussed the results of all of the tests and the disposition. The questions were answered. The patient or family acknowledged understanding and was agreeable with the care plan.       PPE worn: n95 mask, goggles    MEDICATIONS GIVEN IN THE EMERGENCY:  Medications   0.9% sodium chloride BOLUS (0 mLs Intravenous Stopped 9/14/22 1920)   propranolol (INDERAL) injection 1 mg (1 mg Intravenous Given 9/14/22 2105)   iopamidol  (ISOVUE-370) solution 100 mL (100 mLs Intravenous Given 9/14/22 1943)       NEW PRESCRIPTIONS STARTED AT TODAY'S ER VISIT  Discharge Medication List as of 9/14/2022  9:57 PM      START taking these medications    Details   propranolol (INDERAL) 60 MG tablet Take 1 tablet (60 mg) by mouth 2 times daily for 30 days, Disp-60 tablet, R-0, E-Prescribe                =================================================================    HPI    Patient information was obtained from: Patient      Use of : N/A     Biju Chowdhury is a 37 year old male with no pertinent history who presents to this ED via ambulance for evaluation of shortness of breath and fatigue.     Patient presents with shortness of breath and palpitations which has been intermittent for a month now. He reports that today's symptoms are the worst of all. Patient reports that after working on car or a 5-10 minute exercise feels like patient has run 1 mile. At work today, patient reports working on project and taking chains down made patient short of breath and tired to the point he could not get up. Patient reports of having stress due to family passing in July. Patient denies SOB while resting and but when going down stairs patient is SOB. Patient reports of drinking caffeine but no more than usual. Patient denies, leg swelling, cough, vomiting, use of alcohol, drug use and any other complaints or concerns.     Note of, patient's sister passed due to blood clot and patient father was in the hospital for numerus reasons.       REVIEW OF SYSTEMS   Review of Systems   Constitutional: Positive for fatigue.   Respiratory: Positive for shortness of breath. Negative for cough.    Cardiovascular: Positive for palpitations. Negative for leg swelling.   Gastrointestinal: Negative for nausea and vomiting.   All other systems reviewed and are negative.       PAST MEDICAL HISTORY:  History reviewed. No pertinent past medical history.    PAST SURGICAL  "HISTORY:  History reviewed. No pertinent surgical history.        CURRENT MEDICATIONS:    propranolol (INDERAL) 60 MG tablet        ALLERGIES:  No Known Allergies    FAMILY HISTORY:  No family history on file.    SOCIAL HISTORY:   Social History     Socioeconomic History     Marital status:      Spouse name: None     Number of children: None     Years of education: None     Highest education level: None   Tobacco Use     Smoking status: Never Smoker     Smokeless tobacco: Never Used   Substance and Sexual Activity     Alcohol use: No     Drug use: No     Sexual activity: Yes     Partners: Female     Birth control/protection: None   Other Topics Concern     Parent/sibling w/ CABG, MI or angioplasty before 65F 55M? No       VITALS:  /86   Pulse 120   Temp 98.4  F (36.9  C) (Oral)   Resp 18   Ht 1.803 m (5' 11\")   Wt 68 kg (149 lb 14.6 oz)   SpO2 99%   BMI 20.91 kg/m      PHYSICAL EXAM    General presentation: Alert, Vital signs reviewed. Fatigued appearing.   HENT: ENT inspection is normal. Oropharynx is moist and clear.   Eye: Pupils are equal and reactive to light. EOMI  Neck: The neck is supple, with full ROM, with no evidence of meningismus.  Pulmonary: Currently in no acute respiratory distress. Normal, non labored respirations, the lung sounds are normal with good equal air movement. Clear to auscultation bilaterally.   Circulatory: Tachycardia with normal rhythm. Peripheral pulses are strong and equal. No murmurs, rubs, or gallops.   Abdominal: The abdomen is soft. Nontender. No rigidity, guarding, or rebound. Bowel sounds normal.   Neurologic: Alert, oriented to person, place, and time. No motor deficit. No sensory deficit. Cranial nerves II through XII are intact.  Musculoskeletal: No extremity tenderness. Full range of motion in all extremities. No extremity edema.   Skin: Skin color is normal. No rash. Warm. Dry to touch.      LAB:  All pertinent labs reviewed and interpreted.  Results " for orders placed or performed during the hospital encounter of 09/14/22   CT Chest Pulmonary Embolism w Contrast    Impression    IMPRESSION:  1.  No evidence for pulmonary embolism.   Comprehensive metabolic panel   Result Value Ref Range    Sodium 139 136 - 145 mmol/L    Potassium 4.1 3.5 - 5.0 mmol/L    Chloride 104 98 - 107 mmol/L    Carbon Dioxide (CO2) 25 22 - 31 mmol/L    Anion Gap 10 5 - 18 mmol/L    Urea Nitrogen 17 8 - 22 mg/dL    Creatinine 1.04 0.70 - 1.30 mg/dL    Calcium 10.4 8.5 - 10.5 mg/dL    Glucose 107 70 - 125 mg/dL    Alkaline Phosphatase 97 45 - 120 U/L    AST 46 (H) 0 - 40 U/L     (H) 0 - 45 U/L    Protein Total 7.3 6.0 - 8.0 g/dL    Albumin 3.7 3.5 - 5.0 g/dL    Bilirubin Total 0.4 0.0 - 1.0 mg/dL    GFR Estimate >90 >60 mL/min/1.73m2   Result Value Ref Range    Troponin I 0.01 0.00 - 0.29 ng/mL   Result Value Ref Range    Magnesium 1.8 1.8 - 2.6 mg/dL   TSH with free T4 reflex   Result Value Ref Range    TSH <0.01 (L) 0.30 - 5.00 uIU/mL   Ethyl Alcohol Level   Result Value Ref Range    Alcohol, Blood <10 None detected mg/dL   Symptomatic; Unknown Influenza A/B & SARS-CoV2 (COVID-19) Virus PCR Multiplex Nasopharyngeal    Specimen: Nasopharyngeal; Swab   Result Value Ref Range    Influenza A PCR Negative Negative    Influenza B PCR Negative Negative    RSV PCR Negative Negative    SARS CoV2 PCR Negative Negative   CBC with platelets and differential   Result Value Ref Range    WBC Count 4.2 4.0 - 11.0 10e3/uL    RBC Count 5.89 4.40 - 5.90 10e6/uL    Hemoglobin 15.0 13.3 - 17.7 g/dL    Hematocrit 46.1 40.0 - 53.0 %    MCV 78 78 - 100 fL    MCH 25.5 (L) 26.5 - 33.0 pg    MCHC 32.5 31.5 - 36.5 g/dL    RDW 13.2 10.0 - 15.0 %    Platelet Count 312 150 - 450 10e3/uL    % Neutrophils 53 %    % Lymphocytes 29 %    % Monocytes 15 %    % Eosinophils 2 %    % Basophils 1 %    % Immature Granulocytes 0 %    NRBCs per 100 WBC 0 <1 /100    Absolute Neutrophils 2.3 1.6 - 8.3 10e3/uL    Absolute  Lymphocytes 1.2 0.8 - 5.3 10e3/uL    Absolute Monocytes 0.6 0.0 - 1.3 10e3/uL    Absolute Eosinophils 0.1 0.0 - 0.7 10e3/uL    Absolute Basophils 0.0 0.0 - 0.2 10e3/uL    Absolute Immature Granulocytes 0.0 <=0.4 10e3/uL    Absolute NRBCs 0.0 10e3/uL   Result Value Ref Range    Lipase 41 0 - 52 U/L   CRP inflammation   Result Value Ref Range    CRP 0.1 0.0 - <0.8 mg/dL   Drugs of Abuse 1+ Panel, Urine ([facility] Only)   Result Value Ref Range    Amphetamines Urine Screen Negative Screen Negative    Benzodiazepines Urine Screen Negative Screen Negative    Opiates Urine Screen Negative Screen Negative    PCP Urine Screen Negative Screen Negative    Cannabinoids Urine Screen Negative Screen Negative    Barbiturates Urine Screen Negative Screen Negative    Cocaine Urine Screen Negative Screen Negative    Methadone Urine Screen Negative Screen Negative    Oxycodone Urine Screen Negative Screen Negative    Creatinine Urine mg/dL 234 mg/dL       RADIOLOGY:  Reviewed all pertinent imaging. Please see official radiology report.  CT Chest Pulmonary Embolism w Contrast   Final Result   IMPRESSION:   1.  No evidence for pulmonary embolism.          EKG:   Sinus tachycardia.  Rate 132.  Normal QRS.  Normal QT.  ST segment elevations noted in the anterior leads with T wave inversions noted in the lateral and inferior leads.  No previous EKG available for comparison.    I have independently reviewed and interpreted the EKG(s) documented above.      I, Beatriz Flores , am serving as a scribe to document services personally performed by Ilir Garvey DO based on my observation and the provider's statements to me. I, Ilir Garvey, attest that Beatriz Flores is acting in a scribe capacity, has observed my performance of the services and has documented them in accordance with my direction.    Ilir Garvey DO  Emergency Medicine  [facility] EMERGENCY DEPARTMENT  [facility] [facility] [address] [phone]         Ilir Garvey,   09/15/22 0035

## 2022-09-14 NOTE — ED TRIAGE NOTES
Patient lost his sister and dad a couple months ago and has been experiencing shortness of breath and fatigue since then. Today while he was working it got worse. Admits to being under a lot of stress. Hx of hyperthyroidism and GI issues.      Triage Assessment     Row Name 09/14/22 8219       Triage Assessment (Adult)    Airway WDL WDL       Respiratory WDL    Respiratory WDL all    Rhythm/Pattern, Respiratory shortness of breath       Skin Circulation/Temperature WDL    Skin Circulation/Temperature WDL WDL       Cardiac WDL    Cardiac WDL WDL       Peripheral/Neurovascular WDL    Peripheral Neurovascular WDL WDL       Cognitive/Neuro/Behavioral WDL    Cognitive/Neuro/Behavioral WDL WDL

## 2022-09-14 NOTE — ED NOTES
"ED Provider In Triage Note  [facility]  Encounter Date: Sep 14, 2022    Chief Complaint   Patient presents with     Shortness of Breath     Fatigue       Brief HPI:   Biju Chowdhury is a 37 year old male presenting to the Emergency Department with a chief complaint of palpitations with shortness of breath. Increased recent stressors.    Brief Physical Exam:  /75   Pulse (!) 130   Temp 98.2  F (36.8  C) (Tympanic)   Resp 20   Ht 1.803 m (5' 11\")   Wt 68 kg (149 lb 14.6 oz)   SpO2 97%   BMI 20.91 kg/m    General: Non-toxic appearing  HEENT: Atraumatic  Resp: No respiratory distress  Abdomen: Non-peritoneal  Neuro: Alert, oriented, answers questions appropriately  Psych: Behavior appropriate    Plan Initiated in Triage:  Orders Placed This Encounter     CT Chest Pulmonary Embolism w Contrast     Comprehensive metabolic panel     Troponin I     Magnesium     TSH with free T4 reflex     Ethyl Alcohol Level     0.9% sodium chloride BOLUS       PIT Dispo:   Place patient in the next available ED bed    Ninfa Ayala MD on 9/14/2022 at 3:55 PM    Patient was evaluated by the Physician in Triage due to a limitation of available rooms in the Emergency Department. A plan of care was discussed based on the information obtained on the initial evaluation and patient was consuled to return back to the Emergency Department lobby after this initial evalutaiton until results were obtained or a room became available in the Emergency Department. Patient was counseled not to leave prior to receiving the results of their workup.     Ninfa Ayala MD  [facility] EMERGENCY DEPARTMENT  [address] [place] [id number]  [phone]     Ninfa Ayala MD  09/14/22 1555    "

## 2022-09-15 LAB — T4 FREE SERPL-MCNC: 5.97 NG/DL (ref 0.9–1.7)

## 2022-09-15 NOTE — ED NOTES
Patient discharged home. No distress. Vital signs improved. Denies chest pain or shortness of breath. No dizziness or lightheadedness, ambulatory with steady gait. IV removed, no redness or swelling. Dressing applied, bleeding controlled. All belongings with patient. Follow up and discharge instructions given. Prescription sent to pharmacy. Patient verbalized understanding of all instructions.

## 2022-09-15 NOTE — DISCHARGE INSTRUCTIONS
Your laboratory tests appear consistent with hyperthyroidism.  The remaining laboratory tests and chest CT scan all appear reassuring.    Take the prescribed propranolol twice a day as directed to treat the hyperthyroidism.      A referral has been placed for you to follow-up with endocrinology at the [facility].  Please follow-up with your scheduled gastroenterology appointment for further evaluation of your elevated liver enzymes.  Return back to the ED sooner for any worsening palpitations, dizziness, chest pain, shortness of breath, or any other new or concerning symptoms.

## 2022-09-15 NOTE — TELEPHONE ENCOUNTER
Informal Recommendations Note    I was called by Dr. Ilir Garvey on 09/14/22 at 7:43pm to provide input for Biju Chowdhury. The nature of this request for input does not permit comprehensive review of health records or patient interview.  I was not requested or am not able to personally examine the patient at this time.    Biju is a 37 year old male who is at the ED for worsening anxiety and palpitation. Symptoms ahas been going on for several month. HR was 120 per ED physician regular sinus rhythm. Work up showed suppressed TSH. [facility] , FT4 of 1.6.   Labs also significant for elevated liver enzymes > 2 times normal. Per ED physician unclear etiology. No hx of alcohol intake.  Patient looked stable otherwise, he had no concern for thyroid storm, planning to discharge home.    Based on the information provided, my recommendations are as follows:   - check FT4, TT3 and TSI  - I will hold off on MMI due to elevated liver enzymes. He has GI appointment in few weeks.  - propranolol for symptoms control 60 mg bid  -urgent referral to endocrine.    These recommendations are not intended to take the place of the care team's clinical judgement, which should always be utilized to provide the most appropriate care to meet the unique needs of each patient.     Edmundo Ulrich MD

## 2022-09-16 NOTE — TELEPHONE ENCOUNTER
DX, Referring NOTES: Hyperthyroidism; referred by Dr. Garvey    For Cancer Patients: Need the original operative and surgical pathology reports and all imaging reports/images related to the disease (includes all thyroid US, nuclear thyroid and total body scans, PET scans, chest CT reports since prior to the diagnosis ).   APPT DATE: 9/26/2022   NOTES (FOR ALL VISITS) STATUS DETAILS   OFFICE NOTES from referring provider N/A    OFFICE NOTES from other specialist Care Everywhere [facility]:  10/7/08 - ENT OV with Dr. Veras   ED NOTES Internal [facility]:  9/14/22 - ED OV with Dr. Garvey   OPERATIVE REPORT  (thyroid, pituitary, adrenal, parathyroid)  (All op notes related to diagnoses) N/A    MEDICATION LIST Internal    IMAGING      CT (HEAD/NECK/CHEST/ABDOMEN) Internal [facility]:  9/14/22 - CT Chest   LABS     DIABETES: HBGA1C, CREATININE, FASTING LIPIDS, MICROALBUMIN URINE, POTASSIUM, TSH, T4    THYROID: TSH, T4, CBC, THYRODLONULIN, TOTAL T3, FREE T4, CALCITONIN, CEA Care Everywhere / Internal [facility]:  9/14/22 - CBC  9/14/22 - CMP  9/14/22 - TSH, T4    [facility]:  6/29/22 - BMP  6/29/22 - T3  6/30/21 - Creatinine

## 2022-09-19 LAB — TSI SER-ACNC: 4.5 TSI INDEX

## 2022-09-26 ENCOUNTER — VIRTUAL VISIT (OUTPATIENT)
Dept: ENDOCRINOLOGY | Facility: CLINIC | Age: 37
End: 2022-09-26
Payer: COMMERCIAL

## 2022-09-26 ENCOUNTER — PRE VISIT (OUTPATIENT)
Dept: ENDOCRINOLOGY | Facility: CLINIC | Age: 37
End: 2022-09-26

## 2022-09-26 DIAGNOSIS — E04.9 GOITER: ICD-10-CM

## 2022-09-26 DIAGNOSIS — R00.0 TACHYCARDIA: ICD-10-CM

## 2022-09-26 DIAGNOSIS — R94.5 ABNORMAL RESULTS OF LIVER FUNCTION STUDIES: ICD-10-CM

## 2022-09-26 DIAGNOSIS — E05.90 HYPERTHYROIDISM: ICD-10-CM

## 2022-09-26 DIAGNOSIS — E05.00 GRAVES DISEASE: Primary | ICD-10-CM

## 2022-09-26 PROCEDURE — 99205 OFFICE O/P NEW HI 60 MIN: CPT | Mod: GT

## 2022-09-26 RX ORDER — METHIMAZOLE 10 MG/1
10 TABLET ORAL 2 TIMES DAILY
Qty: 60 TABLET | Refills: 4 | Status: SHIPPED | OUTPATIENT
Start: 2022-09-26 | End: 2022-10-24

## 2022-09-26 RX ORDER — PROPRANOLOL HYDROCHLORIDE 60 MG/1
60 TABLET ORAL 2 TIMES DAILY
Qty: 180 TABLET | Refills: 3 | Status: SHIPPED | OUTPATIENT
Start: 2022-09-26 | End: 2023-01-23

## 2022-09-26 NOTE — LETTER
9/26/2022       RE: Biju Chowdhury  [address] [place] [address]     Dear Colleague,    Thank you for referring your patient, Biju Chowdhury, to the [facility] ENDOCRINOLOGY [facility] [place] at [facility]. Please see a copy of my visit note below.    Endocrine Consult Video visit note-    Biju Chowdhury  is being evaluated via a billable video visit.      How would you like to obtain your AVS? [email or web address]  For the video visit, send the invitation by: Send to e-mail at: [email or web address]  Will anyone else be joining your video visit? Yes, wife will be on video visit with patient.     Attending Assessment/Plan :     1.  Thyrotoxicosis with high TSI. The high thyroid levels can be documented to late June.  The etiology is most likely Graves' hyperthyroidism .  I have counseled themon Graves' treatment options including antithyroid drugs, radioactive iodine and thyroidectomy. I have given her an information sheet which outlines the advantages and disadvantages of each option and I have reviewed the options.  I have given them  an information sheet which specifically outlines the potential complications and rules for patients on antithyroid medication, including when to call, and our daytime and nighttime contact information.  Start methimazole 10 mg twice/day  Monthly labs standing orders  - next mid October     He May return with activity as tolerated-- letter written     Goiter approx 2 times normal based on volume calculation from the 9/14 CT    Tachycardia.  Ok to continue propranolol at current dose     Abnormal LFTS - perhaps this is due to # 1.  Repeat with next blood draw     Infertility- they are planning to start IUI soon.  Discussed.      Due to the COVID 19 pandemic this visit was a video visit in order to help prevent spread of infection in this patient and the general population. The patient gave verbal consent for the visit today.  I have independently reviewed and interpreted labs, imaging as indicated.     Chart review/prep time 1  [id number]  Visit Start time  1700  Visit Stop time 1745   _75_ minutes spent on the date of the encounter doing chart review, history and exam, documentation and further activities as noted above.    Adrienne Jeter MD    Chief complaint:  Biju is a 37 year old male seen in consultation at the request of Dr Ilir Garvey for hyperthyroidism.   I have reviewed Care Everywhere including [facility] lab reports, imaging reports and provider notes as indicated.      HISTORY OF PRESENT ILLNESS  Biju presents today along with his wife.     Abnormal TFTS were lst noted 6/29/2022 when he presented to [facility] medicine clinic Dr Hyman    Biju recalls he hasn't felt normal for about a year.  He recalls being unable to do anything.  He was dizzy,lightheaded. He felt like he was on a tire swing. He was feeling it more and more.   It flared up at work last week and he had breakdown of emotion.  He thought he was having a heart attack.  He was dizzy, tingling,   He was taken to the ED 9/14/2022. He was noted to have tachycardia.  TFTS were higher than in June.  He was told his liver enzymes were high He was prescribed propranolol.    He has been taking propranolol bid since then and he is feeling better.  He wants to return to work tomorrow .     He does not believe he has had COVID.  There is no known family history of thyroid disease.      Endocrine relevant labs are as follows:  4/10/18 semen analysis: 4.1 ml, pH 7.6, 6.4 mil/ml (> 15 mil/ml), motility 62% (> 40%), progressive motility 60% (> 32%), morphology normal forms 0% (> 4%), WBC < 1 mil/ml (< 1)  4/19/18 FSH 3, free testosterone 10.07, testosterone 610  6/29/2022 TSH < 0.01, free T4 1.6 ng/dl,  free T3 5.2 pg/ml  9/14/2022 TSH < 0.01, free T4 5.97, TSI 4.5, , AST 46, bili 0.4, Ca 10.4, creatinine 1.04,     Relevant imaging is as follows:  "(as read by me as it pertains to endocrine relevant organs)  2022 CT chest PE study with contrast:  Thyroid is normal to generous size ; it has some subtle heterogeneity  Right lobe 2.2 x 3.8 x 6.5  Left lobe 1.6 x 3.7 x 5.8   Isthmus 1.1   overall volume 47 ml    REVIEW OF SYSTEMS  Last felt well  2 days ago; struggling > 1 year   Weight loss 10-12 lbs in the past 2 months; current 150, used to be 160  Sometimes so hungry ; not that hungry other times.   Eating the same as usual    Sleep at night \"not the best\"   Always feel warm ; wife notes he always feels hot  Poth teeth operation cancelled last week --   Eyes: no dryness, watering, diplopia  Cardiac: watch records HR - ; -120; he doesn' feel HR as much  Respiratory: less BENAVIDES   Doesn't feel dizzy/ presyncopal  feeling anymore   He has not covid   Starting fertility treatments  IUI soon-- wife says his numbers are low - count, mobility,   10 system ROS otherwise as per the HPI or negative    Past Medical History  Past Medical History:   Diagnosis Date     Left varicocele 2018     Oligospermia 2018     Teratospermia 2018     Tonsillar abscess      Past Surgical History:   Procedure Laterality Date     INCISION AND DRAINAGE PERITONSILLAR ABSCESS  10/07/2008     Medications  Current Outpatient Medications   Medication Sig Dispense Refill     methimazole (TAPAZOLE) 10 MG tablet Take 1 tablet (10 mg) by mouth 2 times daily 60 tablet 4     propranolol (INDERAL) 60 MG tablet Take 1 tablet (60 mg) by mouth 2 times daily 180 tablet 3     Allergies  No Known Allergies    Family History  family history includes Multiple Sclerosis in his maternal uncle and mother.   Father had heart issue since one year ago /2021; hospice;  2022; he was smoker.    Oldest sister Lives in [place]     Social History  Social History     Tobacco Use     Smoking status: Never Smoker     Smokeless tobacco: Never Used   Substance Use Topics     Alcohol use: No     " "Drug use: No     Works as    He hasn't been back to work since ED visit  [place];   19 year old son  ; Wife is 9 years older than him.    Father and one sister  2 weeks apart in July    Physical Exam  There is no height or weight on file to calculate BMI.   BP Readings from Last 1 Encounters:   22 139/86      Pulse Readings from Last 1 Encounters:   22 120      Resp Readings from Last 1 Encounters:   22 18      Temp Readings from Last 1 Encounters:   22 98.4  F (36.9  C) (Oral)      SpO2 Readings from Last 1 Encounters:   22 99%      Wt Readings from Last 1 Encounters:   22 68 kg (149 lb 14.6 oz)      Ht Readings from Last 1 Encounters:   22 1.803 m (5' 11\")     GENERAL: young man in no distress  SKIN: Visible skin clear. No significant rash, abnormal pigmentation or lesions.  EYES: Eyes grossly normal to inspection.  No discharge or erythema, or obvious scleral/conjunctival abnormalities.  NECK: visible goiter is not present; no visible neck masses  RESP: No audible wheeze, cough, or visible cyanosis.  No visible retractions or increased work of breathing.    NEURO: Awake, alert, responds appropriately to questions.  Mentation and speech fluent.  PSYCH:affect normal and appearance well-groomed.    DATA REVIEW      ENDO THYROID LABS-[facility] Latest Ref Rng & Units 2022   TSH 0.30 - 5.00 uIU/mL <0.01 (L)    FREE T4 0.90 - 1.70 ng/dL 5.97 (H)    THYROID STIM IMMUNOG <=1.3 TSI index 4.5 (H)    SEX HORMONE BINDING GLOBULIN 11 - 80 nmol/L  50       Again, thank you for allowing me to participate in the care of your patient.      Sincerely,    Adrienne Jeter MD      "

## 2022-09-26 NOTE — PROGRESS NOTES
Endocrine Consult Video visit note-    Biju Chowdhury  is being evaluated via a billable video visit.      How would you like to obtain your AVS? [email or web address]  For the video visit, send the invitation by: Send to e-mail at: [email or web address]  Will anyone else be joining your video visit? Yes, wife will be on video visit with patient.     Attending Assessment/Plan :     1.  Thyrotoxicosis with high TSI. The high thyroid levels can be documented to late June.  The etiology is most likely Graves' hyperthyroidism .  I have counseled themon Graves' treatment options including antithyroid drugs, radioactive iodine and thyroidectomy. I have given her an information sheet which outlines the advantages and disadvantages of each option and I have reviewed the options.  I have given them  an information sheet which specifically outlines the potential complications and rules for patients on antithyroid medication, including when to call, and our daytime and nighttime contact information.  Start methimazole 10 mg twice/day  Monthly labs standing orders  - next mid October     He May return with activity as tolerated-- letter written     Addendum  10/24/2022 TSH < 0.01, free T4 1.93, T3 164 - on MMI 10 mg bid;  Reduce to 10 mg/day  11/28/22 TSH < 0.01, free T4 1.5, T3 128 on MMI 10 mg/day  12/26/22 TSH 0.02, free T4 1.25, T3 90 on MMI 10 mg/day.      Goiter approx 2 times normal based on volume calculation from the 9/14 CT    Tachycardia.  Ok to continue propranolol at current dose     Abnormal LFTS - perhaps this is due to # 1.  Repeat with next blood draw     Infertility- they are planning to start IUI soon.  Discussed.      Due to the COVID 19 pandemic this visit was a video visit in order to help prevent spread of infection in this patient and the general population. The patient gave verbal consent for the visit today. I have independently reviewed and interpreted labs, imaging as indicated.     Chart review/prep  time 1  [id number]  Visit Start time  1700  Visit Stop time 1745   _75_ minutes spent on the date of the encounter doing chart review, history and exam, documentation and further activities as noted above.    Adrienne Jeter MD    Chief complaint:  Biju is a 37 year old male seen in consultation at the request of Dr Ilir Garvey for hyperthyroidism.   I have reviewed Care Everywhere including [facility] lab reports, imaging reports and provider notes as indicated.      HISTORY OF PRESENT ILLNESS  Biju presents today along with his wife.     Abnormal TFTS were lst noted 6/29/2022 when he presented to [facility] family medicine clinic Dr Hyman    Biju recalls he hasn't felt normal for about a year.  He recalls being unable to do anything.  He was dizzy,lightheaded. He felt like he was on a tire swing. He was feeling it more and more.   It flared up at work last week and he had breakdown of emotion.  He thought he was having a heart attack.  He was dizzy, tingling,   He was taken to the ED 9/14/2022. He was noted to have tachycardia.  TFTS were higher than in June.  He was told his liver enzymes were high He was prescribed propranolol.    He has been taking propranolol bid since then and he is feeling better.  He wants to return to work tomorrow .     He does not believe he has had COVID.  There is no known family history of thyroid disease.      Endocrine relevant labs are as follows:  4/10/18 semen analysis: 4.1 ml, pH 7.6, 6.4 mil/ml (> 15 mil/ml), motility 62% (> 40%), progressive motility 60% (> 32%), morphology normal forms 0% (> 4%), WBC < 1 mil/ml (< 1)  4/19/18 FSH 3, free testosterone 10.07, testosterone 610  6/29/2022 TSH < 0.01, free T4 1.6 ng/dl,  free T3 5.2 pg/ml  9/14/2022 TSH < 0.01, free T4 5.97, TSI 4.5, , AST 46, bili 0.4, Ca 10.4, creatinine 1.04,     Relevant imaging is as follows: (as read by me as it pertains to endocrine relevant organs)  9/14/2022 CT chest PE study with  "contrast:  Thyroid is normal to generous size ; it has some subtle heterogeneity  Right lobe 2.2 x 3.8 x 6.5  Left lobe 1.6 x 3.7 x 5.8   Isthmus 1.1   overall volume 47 ml    REVIEW OF SYSTEMS  Last felt well  2 days ago; struggling > 1 year   Weight loss 10-12 lbs in the past 2 months; current 150, used to be 160  Sometimes so hungry ; not that hungry other times.   Eating the same as usual    Sleep at night \"not the best\"   Always feel warm ; wife notes he always feels hot  Pillager teeth operation cancelled last week --   Eyes: no dryness, watering, diplopia  Cardiac: watch records HR - ; -120; he doesn' feel HR as much  Respiratory: less BENAVIDES   Doesn't feel dizzy/ presyncopal  feeling anymore   He has not covid   Starting fertility treatments  IUI soon-- wife says his numbers are low - count, mobility,   10 system ROS otherwise as per the HPI or negative    Past Medical History  Past Medical History:   Diagnosis Date     Left varicocele 2018     Oligospermia 2018     Teratospermia 2018     Tonsillar abscess      Past Surgical History:   Procedure Laterality Date     INCISION AND DRAINAGE PERITONSILLAR ABSCESS  10/07/2008     Medications  Current Outpatient Medications   Medication Sig Dispense Refill     methimazole (TAPAZOLE) 10 MG tablet Take 1 tablet (10 mg) by mouth 2 times daily 60 tablet 4     propranolol (INDERAL) 60 MG tablet Take 1 tablet (60 mg) by mouth 2 times daily 180 tablet 3     Allergies  No Known Allergies    Family History  family history includes Multiple Sclerosis in his maternal uncle and mother.   Father had heart issue since one year ago 2021; hospice;  2022; he was smoker.    Oldest sister Lives in [place]     Social History  Social History     Tobacco Use     Smoking status: Never Smoker     Smokeless tobacco: Never Used   Substance Use Topics     Alcohol use: No     Drug use: No     Works as    He hasn't been back to work since ED visit  [place];   19 year old son  ; Wife is 9 years older than him.    Father and one sister  2 weeks apart in July    Physical Exam  There is no height or weight on file to calculate BMI.   BP Readings from Last 1 Encounters:   22 139/86      Pulse Readings from Last 1 Encounters:   22 120      Resp Readings from Last 1 Encounters:   22 18      Temp Readings from Last 1 Encounters:   22 98.4  F (36.9  C) (Oral)      SpO2 Readings from Last 1 Encounters:   22 99%      Wt Readings from Last 1 Encounters:   22 68 kg (149 lb 14.6 oz)      Ht Readings from Last 1 Encounters:   22 1.803 m (5' 11\")     GENERAL: young man in no distress  SKIN: Visible skin clear. No significant rash, abnormal pigmentation or lesions.  EYES: Eyes grossly normal to inspection.  No discharge or erythema, or obvious scleral/conjunctival abnormalities.  NECK: visible goiter is not present; no visible neck masses  RESP: No audible wheeze, cough, or visible cyanosis.  No visible retractions or increased work of breathing.    NEURO: Awake, alert, responds appropriately to questions.  Mentation and speech fluent.  PSYCH:affect normal and appearance well-groomed.    DATA REVIEW      ENDO THYROID LABS-[facility] Latest Ref Rng & Units 2022   TSH 0.30 - 5.00 uIU/mL <0.01 (L)    FREE T4 0.90 - 1.70 ng/dL 5.97 (H)    THYROID STIM IMMUNOG <=1.3 TSI index 4.5 (H)    SEX HORMONE BINDING GLOBULIN 11 - 80 nmol/L  50     "

## 2022-09-26 NOTE — PATIENT INSTRUCTIONS
Continue propranolol  Start methimazole 10 mg twice/day  Monthly labs  -           Information for patients on Tapazole or Propylthiouracil (PTU)  The generic name for Tapazole is methimazole.      The drugs, Tapazole and PTU are used to treat an overactive thyroid (hyperthyroidism).      They prevent the thyroid from making too much thyroid hormone.  You can think of them as putting up a road block in your thyroid.    These drugs are usually well tolerated and safe, but rarely can cause serious side effects.  The two worst potential side-effects are:  agranulocytosis.  This is the loss of the white blood cells that fight infection.  This can occur in approximately 1 in 200 people.  liver problems.  This is even more rare than agranulocytosis but it can be very serious.    Because of the serious nature of the rare side-effects, you should notify your doctor if you develop the following symptoms while taking the drug:  Fever  sore throat  flu-like symptoms (nausea, vomiting, diarrhea, aches, abdominal pain)    In addition, anytime you have evidence of infection, you should get a blood test to be sure that you do not have agranulocytosis.  A prescription will be provided to you to get this blood test as needed.  This is an extra precaution and the results should be available the same day the blood test is drawn.  If your blood count is OK (which it almost always is), then you may continue to take the antithyroid drug.    While taking this medication, your doctor will probably want to see you frequently, every 1-2 months, at least for a time.  This is important so that the response can be monitored and the dose can be adjusted.      If you have concerns you may reach us at [phone] during regular hours, and [phone] (ask for endocrinologist on call) after hours.        Options for Treatment of Hyperthyroidism in Graves  hyperthyroidism    There are 3 options for treating hyperthyroidism.  The treatment method  that is best for you depends on several factors, including your age, general health status, pregnancy status, convenience and preferences.      The options for treatment are listed below with advantages and disadvantages of each:    Medicine.      This requires taking a pill one to 3 times / day.  You will require monthly follow-up with me during the time you are taking the pills.  The pills will control the ability of the thyroid to make too much thyroid hormone and you will gradually improve.      Advantages:  This is an easy and effective form of therapy.    Disadvantages:  This only controls the thyroid while you take the pills. With discontinuation of the pills, the hyperthyroidism will relapse probably within days.    Side-effects are very rare but can be serious, including agranulocytosis (or the loss of white blood cells that control infection).      The pills do not come in an intravenous form, which can make treatment very difficult if you are sick and unable to take oral medication.  The medication is not a life long option, but an acceptable short term treatment.      Radioactive iodine    Since the thyroid uses iodine to make thyroid hormone, administration of appropriate doses of radioactive iodine will specifically target and destroy the thyroid, thereby treating the over-activity.     Procedure:  Before the treatment, it is necessary that we determine the function of your thyroid gland by performing a thyroid uptake test in the radiology department at the hospital.  This is a 2-day procedure.  Day 1 involves oral administration of a very small dose of radioactive iodine.  On day 2 (24 hours after the dose was administered), you will lay under the camera, which will take a picture of your thyroid.  This will give us information about your thyroid s function, which we need in order to calculate your treatment dose of radioactive iodine.     The treatment dose of radioactive iodine is delivered either  later on the same day (day 2 of the uptake test), or at your earliest convenience.  The treatment is again an oral administration of radioactive iodine, but the dose is much higher than that used for the uptake test.  You will then gradually return to normal thyroid function over a period of weeks to months.      Advantages:  This is an easy and effective form of therapy.  Painless.      Disadvantages:  In many cases this treatment results in permanent hypothyroidism (thyroid under-activity) which will require thyroid hormone replacement pills for the rest of your life).      Women of reproductive age must be documented to not be pregnant before the treatment.  We also recommend that you not attempt pregnancy for 6 months after the treatment.    This treatment may increase the thyroid stimulating antibody levels and worsen the eye disease of Graves .     Surgical removal of the thyroid gland.      Surgical removal of the thyroid gland will quickly result in hypothyroidism (requirement for thyroid hormone replacement).  For your safety, it is important that your thyroid function is normal prior to the operation (controlled by the anti-thyroid medication) and also that you have an experienced thyroid surgeon perforbrannom the operation.    Advantages:  Effective for treatment of hyperthyroidism.      Disadvantages: Risk of damage to the recurrent laryngeal nerve (which can lead to hoarseness); risk of damage to the parathyroid glands (which can lead to low calcium).  Anesthesia risks.  General surgical risks of bleeding, infection.

## 2022-09-26 NOTE — LETTER
Patient:  Biju Chowdhury  :   1985    Patient Name:  Biju Chowdhury    Physician: Adrienne Jeter MD    To whom it may concern:    Biju Hampton is under my care for a medical condition.  He    may return to work on 2022    Patient Limitations:    Activity level as tolerated.  He should rest if he needs to rest.   May advance to full time as tolerated.    Sincerely,      Adrienne Jeter MD

## 2022-10-24 ENCOUNTER — LAB (OUTPATIENT)
Dept: LAB | Facility: CLINIC | Age: 37
End: 2022-10-24
Payer: COMMERCIAL

## 2022-10-24 DIAGNOSIS — R94.5 ABNORMAL RESULTS OF LIVER FUNCTION STUDIES: ICD-10-CM

## 2022-10-24 DIAGNOSIS — E05.00 GRAVES DISEASE: ICD-10-CM

## 2022-10-24 DIAGNOSIS — E05.90 HYPERTHYROIDISM: ICD-10-CM

## 2022-10-24 LAB
ALBUMIN SERPL BCG-MCNC: 4.3 G/DL (ref 3.5–5.2)
ALP SERPL-CCNC: 76 U/L (ref 40–129)
ALT SERPL W P-5'-P-CCNC: 56 U/L (ref 10–50)
AST SERPL W P-5'-P-CCNC: 50 U/L (ref 10–50)
BILIRUB DIRECT SERPL-MCNC: <0.2 MG/DL (ref 0–0.3)
BILIRUB SERPL-MCNC: 0.6 MG/DL
PROT SERPL-MCNC: 7.5 G/DL (ref 6.4–8.3)
T3 SERPL-MCNC: 164 NG/DL (ref 85–202)
T4 FREE SERPL-MCNC: 1.93 NG/DL (ref 0.9–1.7)
TSH SERPL DL<=0.005 MIU/L-ACNC: <0.01 UIU/ML (ref 0.3–4.2)

## 2022-10-24 PROCEDURE — 36415 COLL VENOUS BLD VENIPUNCTURE: CPT

## 2022-10-24 PROCEDURE — 84480 ASSAY TRIIODOTHYRONINE (T3): CPT

## 2022-10-24 PROCEDURE — 84443 ASSAY THYROID STIM HORMONE: CPT

## 2022-10-24 PROCEDURE — 84439 ASSAY OF FREE THYROXINE: CPT

## 2022-10-24 PROCEDURE — 80076 HEPATIC FUNCTION PANEL: CPT

## 2022-10-24 RX ORDER — METHIMAZOLE 10 MG/1
10 TABLET ORAL DAILY
Qty: 30 TABLET | Refills: 4 | Status: SHIPPED | OUTPATIENT
Start: 2022-10-24 | End: 2023-01-23

## 2022-10-26 ENCOUNTER — NURSE TRIAGE (OUTPATIENT)
Dept: CALL CENTER | Age: 37
End: 2022-10-26

## 2022-10-26 NOTE — TELEPHONE ENCOUNTER
I spoke with Sylvie. Labs were done Monday when the rash appeared. [place]ali noting family has covid  and he was sick  last week Tuesday with fever but didn't check for covid . He can't smell or taste this week with the rash being the worse.  It's all over  His back, inner thighs, shoulders a fine rash with white tops. No welts , bumps  or blisters. He questions  The Methimazole but he started that 9/27/22 with rash now for three days . Using  Anti itch cream on it Melinda Drummond RN on 10/26/2022 at 10:02 AM

## 2022-10-26 NOTE — TELEPHONE ENCOUNTER
Nurse Triage SBAR    Is this a 2nd Level Triage? YES, LICENSED PRACTITIONER REVIEW IS REQUIRED   Pt request that his wife, Sylvie, be called for f/u- lab results and any medication changes   Sylvie # [phone]    Pt will be at work - works w/ [employer] hard to hear and  May not be available    Pharm: [facility]  [facility] # [id number]    Situation:  Rash- see note  Pt and SO- Sylvie-  wondering if this is related to new medication -methimazole- Rx  by Dr. Light    Background: New med - started 9-27-22    Assessment: ? Cause for rash    Protocol Recommended Disposition:   Home Care    Recommendation: Pt will f/u w/ PCP or urgent careregarding the rash. OTC for itching/sleep.    Pt/ Sylvie would like to know if Methimazole could be causing the rash and would like to know recent lab results.       Reason for Disposition    Widespread hives    Additional Information    Negative: Difficulty breathing or wheezing now    Negative: Rapid onset of swollen tongue    Negative: Rapid onset of hoarseness or cough    Negative: Very weak (can't stand)    Negative: Difficult to awaken or acting confused (e.g., disoriented, slurred speech)    Negative: Life-threatening reaction (anaphylaxis) in the past to similar substance (e.g., food, insect bite/sting, chemical, etc.) and < 2 hours since exposure    Negative: Sounds like a life-threatening emergency to the triager    Negative: Bee, wasp, or yellow jacket sting within last 24 hours    Negative: Taking a new medicine now or within last 3 days  (Exception: Antihistamine, decongestant or other OTC cough/cold medicines.)    Negative: Doesn't match the SYMPTOMS of hives    Negative: Swollen tongue    Negative: Widespread hives, itching, or facial swelling and onset < 2 hours of exposure to high-risk allergen (e.g., 1st dose of antibiotic, nuts, sting)    Negative: Patient sounds very sick or weak to the triager    Negative: MODERATE-SEVERE hives persist (i.e., hives interfere with normal  "activities or work) and taking antihistamine (e.g., Benadryl, Claritin) > 24 hours    Negative: Hives have become worse and taking oral steroids (e.g., prednisone) > 24 hours    Negative: Abdominal pain    Negative: Joint swelling    Negative: Fever    Negative: Patient wants to be seen    Negative: Hives persist > 1 week    Negative: Widespread hives and onset > 2 hours of exposure to high-risk allergen (e.g., peanuts, tree nuts, fish, or shellfish)    Negative: Hives from food reaction and diagnosis never confirmed by a doctor (or NP/PA)    Negative: Hives has occurred 3 or more times in the last year and the cause is not known    Answer Assessment - Initial Assessment Questions  1. APPEARANCE: \"What does the rash look like?\"      Legs- inner part of legs, both shoulderand : 'Hives/rash', size of dots, darkcolor,  covers the size of his hands,  No drainage,Itchy   started 3 days  Pt and SO- Sylvie-  wondering if this is related to new medication  methimazole by Dr. Light.  Hx Covid, last week-? Rash related to that.    2. LOCATION: \"Where is the rash located?\"       See above  3. NUMBER: \"How many hives are there?\"      See above  4. SIZE: \"How big are the hives?\" (inches, cm, compare to coins) \"Do they all look the same or is there lots of variation in shape and size?\"   See above      5. ONSET: \"When did the hives begin?\" (Hours or days ago)       3 days ago    6. ITCHING: \"Does it itch?\" If Yes, ask: \"How bad is the itch?\"     - MILD: doesn't interfere with normal activities    - MODERATE-SEVERE: interferes with work, school, sleep, or other activities         Itches, mod-severe    7. RECURRENT PROBLEM: \"Have you had hives before?\" If Yes, ask: \"When was the last time?\" and \"What happened that time?\"       no  8. TRIGGERS: \"Were you exposed to any new food, plant, cosmetic product or animal just before the hives began?\"      Denies any new products    9. OTHER SYMPTOMS: \"Do you have any other symptoms?\" " "(e.g., fever, tongue swelling, difficulty breathing, abdominal pain)    Denies any of the above  Hx Covid- last week, confirmed by a home test.      10. PREGNANCY: \"Is there any chance you are pregnant?\" \"When was your last menstrual period?\"  NA    Protocols used: HIVES-RONNI-[facility]      "

## 2022-10-27 NOTE — TELEPHONE ENCOUNTER
I can only reach his wife at both numbers provided. I gave her the letter information . She tells me they do not want my chart so cannot send a picture he didn't complain about the rash this morning. He will take 10 mg once daily and repeat labs in one month. Melinda Drummond RN on 10/27/2022 at 10:47 AM

## 2022-10-27 NOTE — TELEPHONE ENCOUNTER
Please ask him to take a picture of it and send to us via [employer].  He is not current on [employer] so ideally you would also help him get on [employer].  Read him the letter I sent a few days ago, about reducing the dose of methimazole.   He can use over the counter antihistamine, such as benadryl  Adrienne Jeter MD

## 2022-11-28 ENCOUNTER — LAB (OUTPATIENT)
Dept: LAB | Facility: CLINIC | Age: 37
End: 2022-11-28
Payer: COMMERCIAL

## 2022-11-28 DIAGNOSIS — E05.00 GRAVES DISEASE: ICD-10-CM

## 2022-11-28 LAB
T3 SERPL-MCNC: 128 NG/DL (ref 85–202)
T4 FREE SERPL-MCNC: 1.5 NG/DL (ref 0.9–1.7)
TSH SERPL DL<=0.005 MIU/L-ACNC: <0.01 UIU/ML (ref 0.3–4.2)

## 2022-11-28 PROCEDURE — 84480 ASSAY TRIIODOTHYRONINE (T3): CPT

## 2022-11-28 PROCEDURE — 84443 ASSAY THYROID STIM HORMONE: CPT

## 2022-11-28 PROCEDURE — 84439 ASSAY OF FREE THYROXINE: CPT

## 2022-11-28 PROCEDURE — 36415 COLL VENOUS BLD VENIPUNCTURE: CPT

## 2022-12-05 ENCOUNTER — TELEPHONE (OUTPATIENT)
Dept: ENDOCRINOLOGY | Facility: CLINIC | Age: 37
End: 2022-12-05

## 2022-12-05 NOTE — TELEPHONE ENCOUNTER
"Spoke w/ Pt's Spouse: last time Pt had dental extraction \"Thyroid was so high\"; Dentist will not put under anesthesia until confirmation from Endocrine that withdrawal is appropriate related to thyroid levels. Extraction is next Monday and has already been paid for.   Provider notified.   Abbey Reinoso RN on 12/5/2022 at 10:43 AM     "

## 2022-12-05 NOTE — TELEPHONE ENCOUNTER
Pt asks that we call in an hour.   [facility]   Abbey Reinoso RN on 12/5/2022 at 12:04 PM         RE    He can have local anesthesia.  Letter written.    Adrienne Jeter MD

## 2022-12-05 NOTE — TELEPHONE ENCOUNTER
[facility] Call Center    Phone Message    May a detailed message be left on voicemail: yes     Reason for Call: Other: pt's wife called and she stated her  needs teeth pulled and the dentist needs the OK from Endo to clear him to have this done. They need to make sure his numbers are looking ok. Thank you.     Action Taken: Other: Endo    Travel Screening: Not Applicable

## 2022-12-06 ENCOUNTER — TELEPHONE (OUTPATIENT)
Dept: ENDOCRINOLOGY | Facility: CLINIC | Age: 37
End: 2022-12-06

## 2022-12-06 NOTE — TELEPHONE ENCOUNTER
Dental clearance Letter emailed to address provided.   Abbey Reinoso RN on 12/6/2022 at 11:19 AM     Irina Hampton 1 hour ago (9:52 AM)     TB  Per patient wife she wants the letter emailed to her at [email or web address]          Note

## 2022-12-12 ENCOUNTER — TELEPHONE (OUTPATIENT)
Dept: ENDOCRINOLOGY | Facility: CLINIC | Age: 37
End: 2022-12-12

## 2022-12-12 NOTE — TELEPHONE ENCOUNTER
Per Dr Jeter, as Pt is still hyperthyroid, we would not schedule elective surgery at this time. Pt's wife notified. They will reschedule to a time when her will be cleared for general anesthesia.   Abbey Reinoso RN on 12/12/2022 at 11:16 AM

## 2022-12-12 NOTE — TELEPHONE ENCOUNTER
Spoke w/ Pt; they did not know it was anesthesia that he needed general that local would not be good enough. Per dental clinic, Pt choice is for general.   Abbey Reinoso RN on 12/12/2022 at 11:12 AM

## 2022-12-12 NOTE — TELEPHONE ENCOUNTER
I didn't approve general anesthesia.  They didn't ask about general anesthesia.  Why are they waiting to challenge this when he is in the dental chair? Can't they do it with local anesthesia?    Adrienne Jeter MD

## 2022-12-12 NOTE — TELEPHONE ENCOUNTER
"Call Pt's wife  need clearance from Dr Jeter for general anesthesia asap as Pt is \"literally in the dentist chair\"   Abbey Reinoso RN on 12/12/2022 at 11:01 AM     "

## 2022-12-26 ENCOUNTER — LAB (OUTPATIENT)
Dept: LAB | Facility: CLINIC | Age: 37
End: 2022-12-26
Payer: COMMERCIAL

## 2022-12-26 DIAGNOSIS — E05.00 GRAVES DISEASE: ICD-10-CM

## 2022-12-26 LAB
T3 SERPL-MCNC: 90 NG/DL (ref 85–202)
T4 FREE SERPL-MCNC: 1.25 NG/DL (ref 0.9–1.7)
TSH SERPL DL<=0.005 MIU/L-ACNC: 0.02 UIU/ML (ref 0.3–4.2)

## 2022-12-26 PROCEDURE — 84439 ASSAY OF FREE THYROXINE: CPT

## 2022-12-26 PROCEDURE — 36415 COLL VENOUS BLD VENIPUNCTURE: CPT

## 2022-12-26 PROCEDURE — 84480 ASSAY TRIIODOTHYRONINE (T3): CPT

## 2022-12-26 PROCEDURE — 84443 ASSAY THYROID STIM HORMONE: CPT

## 2023-01-23 ENCOUNTER — VIRTUAL VISIT (OUTPATIENT)
Dept: ENDOCRINOLOGY | Facility: CLINIC | Age: 38
End: 2023-01-23
Payer: COMMERCIAL

## 2023-01-23 ENCOUNTER — LAB (OUTPATIENT)
Dept: LAB | Facility: CLINIC | Age: 38
End: 2023-01-23
Payer: COMMERCIAL

## 2023-01-23 DIAGNOSIS — E05.00 GRAVES DISEASE: ICD-10-CM

## 2023-01-23 DIAGNOSIS — R00.0 TACHYCARDIA: ICD-10-CM

## 2023-01-23 DIAGNOSIS — E05.00 GRAVES DISEASE: Primary | ICD-10-CM

## 2023-01-23 DIAGNOSIS — E05.90 HYPERTHYROIDISM: ICD-10-CM

## 2023-01-23 LAB
T3 SERPL-MCNC: 93 NG/DL (ref 85–202)
T4 FREE SERPL-MCNC: 1.18 NG/DL (ref 0.9–1.7)
TSH SERPL DL<=0.005 MIU/L-ACNC: 0.07 UIU/ML (ref 0.3–4.2)

## 2023-01-23 PROCEDURE — 84443 ASSAY THYROID STIM HORMONE: CPT

## 2023-01-23 PROCEDURE — 84480 ASSAY TRIIODOTHYRONINE (T3): CPT

## 2023-01-23 PROCEDURE — 84439 ASSAY OF FREE THYROXINE: CPT

## 2023-01-23 PROCEDURE — 36415 COLL VENOUS BLD VENIPUNCTURE: CPT

## 2023-01-23 PROCEDURE — 99214 OFFICE O/P EST MOD 30 MIN: CPT | Mod: GT

## 2023-01-23 RX ORDER — PROPRANOLOL HYDROCHLORIDE 60 MG/1
60 TABLET ORAL DAILY
Qty: 30 TABLET | Refills: 3 | Status: SHIPPED | OUTPATIENT
Start: 2023-01-23 | End: 2023-05-15

## 2023-01-23 RX ORDER — METHIMAZOLE 10 MG/1
10 TABLET ORAL DAILY
Qty: 30 TABLET | Refills: 4 | Status: SHIPPED | OUTPATIENT
Start: 2023-01-23 | End: 2023-05-15

## 2023-01-23 NOTE — LETTER
1/23/2023       RE: Biju Chowdhury  [address] [place] [address]     Dear Colleague,    Thank you for referring your patient, Biju Chowdhury, to the [facility] at [facility]. Please see a copy of my visit note below.    Endocrine Video visit note-    Attending Assessment/Plan :     1. Graves' hyperthyroidism .  On  methimazole 10 mg bid which is a different dose than I thought he was on/ what I had prescribed.  Reduce methimazole to 10 mg once/day .    Goiter approx 2 times normal based on volume calculation from the 9/14 CT    Tachycardia.  No longer having symptoms on propranolol 60 mg bid.  Reduce propranolol to once/day .    Infertility- they are planning to start IUI in the future.     Need for dental work requiring general anesthesia - as above . He needs to have more normal/safer TFTS     Due to the COVID 19 pandemic this visit was a video visit. The patient gave verbal consent for the visit today.    I have independently reviewed and interpreted labs, imaging as indicated.     Distant Location (provider location):  Off-site  Mode of Communication:  Video Conference via [employer]  Chart review/prep time 1  4601-2961   Visit Start time  1635 doximity invite ; 1636 call nudge; wife joined call and  asked me to email invite to her work email - [email or web address]  Visit Stop time  0375   _34_ minutes spent on the date of the encounter doing chart review, history and exam, documentation and further activities as noted above.    Adrienne Jeter MD    Chief complaint: HISTORY OF PRESENT ILLNESS  Biju presents today along with his wife for follow up of Graves' hyperthyroidism.Abnormal TFTS were lst noted 6/29/2022  I have seen him once before in September.  Since then, we started methimazole 10 mg twice/day.  We reduced to 10 mg/day after the 10/24/22 labs.  The last labs were drawn 12/26/22.      Today  he reports he feels better.  He is mostly back to normal.  He continue sto take both tablets twice/day . He doesn't recall the recommendation to reduce to once/day.     Endocrine relevant labs are as follows:  4/10/18 semen analysis: 4.1 ml, pH 7.6, 6.4 mil/ml (> 15 mil/ml), motility 62% (> 40%), progressive motility 60% (> 32%), morphology normal forms 0% (> 4%), WBC < 1 mil/ml (< 1)  4/19/18 FSH 3, free testosterone 10.07, testosterone 610  6/29/2022 TSH < 0.01, free T4 1.6 ng/dl,  free T3 5.2 pg/ml  9/14/2022 TSH < 0.01, free T4 5.97, TSI 4.5, , AST 46, bili 0.4, Ca 10.4, creatinine 1.04,   10/24/2022 TSH < 0.01, free T4 1.93, T3 164 - on MMI 10 mg bid;  Reduce to 10 mg/day  11/28/22 TSH < 0.01, free T4 1.5, T3 128 on MMI 10 mg/day  12/26/22 TSH 0.02, free T4 1.25, T3 90 on MMI 10 mg/day.  1/23/23 results followed appt TSH 0.07, free t4 1.18, T3 93     Relevant imaging is as follows: (as read by me as it pertains to endocrine relevant organs)  9/14/2022 CT chest PE study with contrast:  Thyroid is normal to generous size ; it has some subtle heterogeneity  Right lobe 2.2 x 3.8 x 6.5  Left lobe 1.6 x 3.7 x 5.8   Isthmus 1.1   overall volume 47 ml    REVIEW OF SYSTEMS  Hasn't checked weight -- wife notes some weight gain   Appetite OK   No longer having hot flashes  Cardiac: negative  Respiratory : negative  GI: BM normal ; some gas depending on food;  Sometimes left lower abdomen pain   No dizziness   Sometimes arms shake a little bit   Needs to get a tooth pulled - will require GA for that.    Hasn't started the fertility treatments.      Past Medical History  Past Medical History:   Diagnosis Date     Left varicocele 2018     Oligospermia 2018     Teratospermia 2018     Tonsillar abscess 2008     Past Surgical History:   Procedure Laterality Date     INCISION AND DRAINAGE PERITONSILLAR ABSCESS  10/07/2008     Medications  Current Outpatient Medications   Medication Sig Dispense Refill     methimazole  "(TAPAZOLE) 10 MG tablet Take 1 tablet (10 mg) by mouth daily 30 tablet 4     propranolol (INDERAL) 60 MG tablet Take 1 tablet (60 mg) by mouth 2 times daily 180 tablet 3     Methimazole 10 mg bid -- the pill bottle has about 20 left . It was filled on 10/24/22    Allergies  No Known Allergies    Family History  Family History   Problem Relation Age of Onset     Multiple Sclerosis Mother      Multiple Sclerosis Maternal Uncle      Social History  Social History     Tobacco Use     Smoking status: Never     Smokeless tobacco: Never   Substance Use Topics     Alcohol use: No     Drug use: No     Works as   -- he will likely be applying to replace his boss.  [employer] license.   ; Wife is 9 years older than him.    Back to playing basket ball   wife's job changed, she is back int he office.    Dogs dutchess (white) and Daniels (boxer like hound not a boxer)    Physical Exam  There is no height or weight on file to calculate BMI.   BP Readings from Last 1 Encounters:   09/14/22 139/86      Pulse Readings from Last 1 Encounters:   09/14/22 120      Resp Readings from Last 1 Encounters:   09/14/22 18      Temp Readings from Last 1 Encounters:   09/14/22 98.4  F (36.9  C) (Oral)      SpO2 Readings from Last 1 Encounters:   09/14/22 99%      Wt Readings from Last 1 Encounters:   09/14/22 68 kg (149 lb 14.6 oz)      Ht Readings from Last 1 Encounters:   09/14/22 1.803 m (5' 11\")     GENERAL: young man in no distress  SKIN: Visible skin clear. No significant rash, abnormal pigmentation or lesions.  EYES: Eyes grossly normal to inspection.  No discharge or erythema, or obvious scleral/conjunctival abnormalities.  NECK: visible goiter is not present; no visible neck masses  RESP: No audible wheeze, cough, or visible cyanosis.  No visible retractions or increased work of breathing.    NEURO: Awake, alert, responds appropriately to questions.  Mentation and speech fluent.  PSYCH:affect normal and appearance " well-groomed.    DATA REVIEW    ENDO THYROID LABS-[facility] Latest Ref Rng & Units 12/26/2022 11/28/2022   TSH 0.30 - 4.20 uIU/mL 0.02 (L) <0.01 (L)   T3 85 - 202 ng/dL 90 128   FREE T4 0.90 - 1.70 ng/dL 1.25 1.50   THYROID STIM IMMUNOG <=1.3 TSI index       ENDO THYROID LABS-[facility] Latest Ref Rng & Units 10/24/2022 9/14/2022   TSH 0.30 - 4.20 uIU/mL <0.01 (L) <0.01 (L)   T3 85 - 202 ng/dL 164    FREE T4 0.90 - 1.70 ng/dL 1.93 (H) 5.97 (H)   THYROID STIM IMMUNOG <=1.3 TSI index  4.5 (H)       Biju is a 37 year old who is being evaluated via a billable telephone visit.    Patient unable to do a video visit because his computer states his camera is unavailable and keeps getting connecting screen but not actually connecting to video.    What phone number would you like to be contacted at? [phone]  How would you like to obtain your AVS?Mail a copy      Again, thank you for allowing me to participate in the care of your patient.      Sincerely,    Adrienne Jeter MD

## 2023-01-23 NOTE — PROGRESS NOTES
Biju is a 37 year old who is being evaluated via a billable telephone visit.    Patient unable to do a video visit because his computer states his camera is unavailable and keeps getting connecting screen but not actually connecting to video.    What phone number would you like to be contacted at? [phone]  How would you like to obtain your AVS?Mail a copy

## 2023-01-23 NOTE — PATIENT INSTRUCTIONS
Reduce methimazole to 10 mg once/day    Las in one month     Reduce propranolol to 60 mg once/day      See me every 4-6 months while on methimazole    We need to get the thyroid labs more normal before you can have anesthesia for dental work.

## 2023-01-23 NOTE — LETTER
Date:January 24, 2023      Provider requested that no letter be sent. Do not send.       [facility]

## 2023-01-23 NOTE — PROGRESS NOTES
Endocrine Video visit note-    Attending Assessment/Plan :     1. Graves' hyperthyroidism .  On  methimazole 10 mg bid which is a different dose than I thought he was on/ what I had prescribed.  Reduce methimazole to 10 mg once/day .    Goiter approx 2 times normal based on volume calculation from the 9/14 CT    Tachycardia.  No longer having symptoms on propranolol 60 mg bid.  Reduce propranolol to once/day .    Infertility- they are planning to start IUI in the future.     Need for dental work requiring general anesthesia - as above . He needs to have more normal/safer TFTS     Due to the COVID 19 pandemic this visit was a video visit. The patient gave verbal consent for the visit today.    I have independently reviewed and interpreted labs, imaging as indicated.     Distant Location (provider location):  Off-site  Mode of Communication:  Video Conference via [employer]  Chart review/prep time 1  7997-2904   Visit Start time  1635 doximity invite ; 1636 call nudge; wife joined call and  asked me to email invite to her work email - [email or web address]  Visit Stop time  5536   _34_ minutes spent on the date of the encounter doing chart review, history and exam, documentation and further activities as noted above.    Adrienne Jeter MD    Chief complaint: HISTORY OF PRESENT ILLNESS  Biju presents today along with his wife for follow up of Graves' hyperthyroidism.Abnormal TFTS were lst noted 6/29/2022  I have seen him once before in September.  Since then, we started methimazole 10 mg twice/day.  We reduced to 10 mg/day after the 10/24/22 labs.  The last labs were drawn 12/26/22.      Today he reports he feels better.  He is mostly back to normal.  He continue sto take both tablets twice/day . He doesn't recall the recommendation to reduce to once/day.     Endocrine relevant labs are as follows:  4/10/18 semen analysis: 4.1 ml, pH 7.6, 6.4 mil/ml (> 15 mil/ml), motility 62% (> 40%), progressive  motility 60% (> 32%), morphology normal forms 0% (> 4%), WBC < 1 mil/ml (< 1)  4/19/18 FSH 3, free testosterone 10.07, testosterone 610  6/29/2022 TSH < 0.01, free T4 1.6 ng/dl,  free T3 5.2 pg/ml  9/14/2022 TSH < 0.01, free T4 5.97, TSI 4.5, , AST 46, bili 0.4, Ca 10.4, creatinine 1.04,   10/24/2022 TSH < 0.01, free T4 1.93, T3 164 - on MMI 10 mg bid;  Reduce to 10 mg/day  11/28/22 TSH < 0.01, free T4 1.5, T3 128 on MMI 10 mg/day  12/26/22 TSH 0.02, free T4 1.25, T3 90 on MMI 10 mg/day.  1/23/23 results followed appt TSH 0.07, free t4 1.18, T3 93     Relevant imaging is as follows: (as read by me as it pertains to endocrine relevant organs)  9/14/2022 CT chest PE study with contrast:  Thyroid is normal to generous size ; it has some subtle heterogeneity  Right lobe 2.2 x 3.8 x 6.5  Left lobe 1.6 x 3.7 x 5.8   Isthmus 1.1   overall volume 47 ml    REVIEW OF SYSTEMS  Hasn't checked weight -- wife notes some weight gain   Appetite OK   No longer having hot flashes  Cardiac: negative  Respiratory : negative  GI: BM normal ; some gas depending on food;  Sometimes left lower abdomen pain   No dizziness   Sometimes arms shake a little bit   Needs to get a tooth pulled - will require GA for that.    Hasn't started the fertility treatments.      Past Medical History  Past Medical History:   Diagnosis Date     Left varicocele 2018     Oligospermia 2018     Teratospermia 2018     Tonsillar abscess 2008     Past Surgical History:   Procedure Laterality Date     INCISION AND DRAINAGE PERITONSILLAR ABSCESS  10/07/2008     Medications  Current Outpatient Medications   Medication Sig Dispense Refill     methimazole (TAPAZOLE) 10 MG tablet Take 1 tablet (10 mg) by mouth daily 30 tablet 4     propranolol (INDERAL) 60 MG tablet Take 1 tablet (60 mg) by mouth 2 times daily 180 tablet 3     Methimazole 10 mg bid -- the pill bottle has about 20 left . It was filled on 10/24/22    Allergies  No Known Allergies    Family  "History  Family History   Problem Relation Age of Onset     Multiple Sclerosis Mother      Multiple Sclerosis Maternal Uncle      Social History  Social History     Tobacco Use     Smoking status: Never     Smokeless tobacco: Never   Substance Use Topics     Alcohol use: No     Drug use: No     Works as   -- he will likely be applying to replace his boss.  [employer] license.   ; Wife is 9 years older than him.    Back to playing basket ball   wife's job changed, she is back int he office.    Dogs dutchess (white) and Daniels (boxer like hound not a boxer)    Physical Exam  There is no height or weight on file to calculate BMI.   BP Readings from Last 1 Encounters:   09/14/22 139/86      Pulse Readings from Last 1 Encounters:   09/14/22 120      Resp Readings from Last 1 Encounters:   09/14/22 18      Temp Readings from Last 1 Encounters:   09/14/22 98.4  F (36.9  C) (Oral)      SpO2 Readings from Last 1 Encounters:   09/14/22 99%      Wt Readings from Last 1 Encounters:   09/14/22 68 kg (149 lb 14.6 oz)      Ht Readings from Last 1 Encounters:   09/14/22 1.803 m (5' 11\")     GENERAL: young man in no distress  SKIN: Visible skin clear. No significant rash, abnormal pigmentation or lesions.  EYES: Eyes grossly normal to inspection.  No discharge or erythema, or obvious scleral/conjunctival abnormalities.  NECK: visible goiter is not present; no visible neck masses  RESP: No audible wheeze, cough, or visible cyanosis.  No visible retractions or increased work of breathing.    NEURO: Awake, alert, responds appropriately to questions.  Mentation and speech fluent.  PSYCH:affect normal and appearance well-groomed.    DATA REVIEW    ENDO THYROID LABS-[facility] Latest Ref Rng & Units 12/26/2022 11/28/2022   TSH 0.30 - 4.20 uIU/mL 0.02 (L) <0.01 (L)   T3 85 - 202 ng/dL 90 128   FREE T4 0.90 - 1.70 ng/dL 1.25 1.50   THYROID STIM IMMUNOG <=1.3 TSI index       ENDO THYROID LABS-[facility] Latest Ref Rng & Units " 10/24/2022 9/14/2022   TSH 0.30 - 4.20 uIU/mL <0.01 (L) <0.01 (L)   T3 85 - 202 ng/dL 164    FREE T4 0.90 - 1.70 ng/dL 1.93 (H) 5.97 (H)   THYROID STIM IMMUNOG <=1.3 TSI index  4.5 (H)

## 2023-02-08 ENCOUNTER — TELEPHONE (OUTPATIENT)
Dept: ENDOCRINOLOGY | Facility: CLINIC | Age: 38
End: 2023-02-08

## 2023-02-10 ENCOUNTER — TELEPHONE (OUTPATIENT)
Dept: ENDOCRINOLOGY | Facility: CLINIC | Age: 38
End: 2023-02-10

## 2023-02-10 NOTE — TELEPHONE ENCOUNTER
LEAH and sent MyChart 2/10/23 for pt to c/b to schedule 4-6 month follow up with Dr. Jeter. Pt can be scheduled in a DAQUAN slot per Dr. Jeter check out orders from 1/23/23.   Roland Paul on 2/10/2023 at 12:51 PM

## 2023-02-15 NOTE — TELEPHONE ENCOUNTER
Spoke to pt's spouse over the phone and scheduled follow up with Dr. Jeter.  Roland Paul on 2/15/2023 at 12:55 PM     home

## 2023-05-08 ENCOUNTER — LAB (OUTPATIENT)
Dept: LAB | Facility: CLINIC | Age: 38
End: 2023-05-08

## 2023-05-08 DIAGNOSIS — E05.00 GRAVES DISEASE: ICD-10-CM

## 2023-05-08 LAB — TSH SERPL DL<=0.005 MIU/L-ACNC: 1.63 UIU/ML (ref 0.3–4.2)

## 2023-05-08 PROCEDURE — 84480 ASSAY TRIIODOTHYRONINE (T3): CPT

## 2023-05-08 PROCEDURE — 36415 COLL VENOUS BLD VENIPUNCTURE: CPT

## 2023-05-08 PROCEDURE — 84443 ASSAY THYROID STIM HORMONE: CPT

## 2023-05-08 PROCEDURE — 84439 ASSAY OF FREE THYROXINE: CPT

## 2023-05-09 LAB
T3 SERPL-MCNC: 95 NG/DL (ref 85–202)
T4 FREE SERPL-MCNC: 1.37 NG/DL (ref 0.9–1.7)

## 2023-05-15 ENCOUNTER — VIRTUAL VISIT (OUTPATIENT)
Dept: ENDOCRINOLOGY | Facility: CLINIC | Age: 38
End: 2023-05-15

## 2023-05-15 DIAGNOSIS — E05.00 GRAVES DISEASE: Primary | ICD-10-CM

## 2023-05-15 DIAGNOSIS — E05.90 HYPERTHYROIDISM: ICD-10-CM

## 2023-05-15 PROCEDURE — 99214 OFFICE O/P EST MOD 30 MIN: CPT | Mod: VID

## 2023-05-15 RX ORDER — METHIMAZOLE 10 MG/1
10 TABLET ORAL DAILY
Qty: 30 TABLET | Refills: 4 | Status: SHIPPED | OUTPATIENT
Start: 2023-05-15

## 2023-05-15 RX ORDER — PROPRANOLOL HYDROCHLORIDE 60 MG/1
60 TABLET ORAL DAILY
Qty: 30 TABLET | Refills: 3 | Status: SHIPPED | OUTPATIENT
Start: 2023-05-15

## 2023-05-15 NOTE — PROGRESS NOTES
Virtual Visit Details    Type of service:  Video Visit   Endocrine Video visit note-    Attending Assessment/Plan :     1. Graves' hyperthyroidism .  On  methimazole  10 mg once/day .  Next labs in July, 2023  Next TSI 9/2023 or after   He continues on propranolol and he is reporting 94 today on propranolol.  Propranolol may continue.     Goiter approx 2 times normal based on volume calculation from the 9/14 CT.  Current status unknown .     Need for dental work requiring general anesthesia - as above .   Letter for dental      Infertility; lost the insurance for this;     Due to the continued risks of COVID 19 this visit was a video visit. The patient gave verbal consent for the visit today.    I have independently reviewed and interpreted labs, imaging as indicated.     Distant Location (provider location):  Off-site  Mode of Communication:  Video Conference mere Weber  Chart review/prep time 1  [id number]  Visit Start time  1738   Visit Stop time  1752   __ minutes spent on the date of the encounter doing chart review, history and exam, documentation and further activities as noted above.    Adrienne Jeter MD    Chief complaint: HISTORY OF PRESENT ILLNESS  Biju presents today, along with his wife,  for follow up of Graves' hyperthyroidism.  Abnormal TFTS were lst noted 6/29/2022   He has been on methimazole since September.  I last saw him 1/23/23.  At that time he was on  Methimazole 10 mg bid and we reduced to 10 mg/day.  Today he confirms he is taking MMI 10 mg/day.  We had been measuring TFTS monthly.  However, There was a gap between the TFTS in January and May due to an insurance problem.  He still hasn't had the dental work, they have been waiting for clearance from me.     Today he reports that he feels back to normal.     Endocrine relevant labs are as follows:  4/10/18 semen analysis: 4.1 ml, pH 7.6, 6.4 mil/ml (> 15 mil/ml), motility 62% (> 40%), progressive motility 60% (> 32%), morphology  "normal forms 0% (> 4%), WBC < 1 mil/ml (< 1)  4/19/18 FSH 3, free testosterone 10.07, testosterone 610  6/29/2022 TSH < 0.01, free T4 1.6 ng/dl,  free T3 5.2 pg/ml  9/14/2022 TSH < 0.01, free T4 5.97, TSI 4.5, , AST 46, bili 0.4, Ca 10.4, creatinine 1.04,   10/24/2022 TSH < 0.01, free T4 1.93, T3 164 - on MMI 10 mg bid;  Reduce to 10 mg/day  11/28/22 TSH < 0.01, free T4 1.5, T3 128 on MMI 10 mg/day  12/26/22 TSH 0.02, free T4 1.25, T3 90 on MMI 10 mg/day.  1/23/23 results followed appt TSH 0.07, free t4 1.18, T3 93   5/8/23 TSH 1.63, free T4 1.37, T3 95     Relevant imaging is as follows: (as read by me as it pertains to endocrine relevant organs)  9/14/2022 CT chest PE study with contrast:  Thyroid is normal to generous size ; it has some subtle heterogeneity  Right lobe 2.2 x 3.8 x 6.5  Left lobe 1.6 x 3.7 x 5.8   Isthmus 1.1   overall volume 47 ml    REVIEW OF SYSTEMS  Weight 170  Sleep at night is good - sleeping more ;   Appetite good; eats 1-2 times/day  Still hasn't had the dental work.    Eyes: negative   Cardiac: negative; HR 94/minute   Respiratory:   GI  Negative; periumbilical \"there\" , \"not pain\".  Has discussed with PCP     Past Medical History  Past Medical History:   Diagnosis Date     Left varicocele 2018     Oligospermia 2018     Teratospermia 2018     Tonsillar abscess 2008     Past Surgical History:   Procedure Laterality Date     INCISION AND DRAINAGE PERITONSILLAR ABSCESS  10/07/2008     Medications  Current Outpatient Medications   Medication Sig Dispense Refill     methimazole (TAPAZOLE) 10 MG tablet Take 1 tablet (10 mg) by mouth daily 30 tablet 4     propranolol (INDERAL) 60 MG tablet Take 1 tablet (60 mg) by mouth daily 30 tablet 3     Allergies  No Known Allergies    Family History  Family History   Problem Relation Age of Onset     Multiple Sclerosis Mother      Heart Disease Father      Multiple Sclerosis Maternal Uncle      Social History  Social History     Tobacco Use     " "Smoking status: Never     Smokeless tobacco: Never   Vaping Use     Vaping status: Never Used   Substance Use Topics     Alcohol use: No     Drug use: No       --  [employer].   ; Wife is 9 years older than him.    Dogs  (white) and Daniels (boxer like hound not a boxer)    Bike ride to mothers house; sister in town; playing basketball ; email: [email or web address].    Insurance is in limbo -     Physical Exam  There is no height or weight on file to calculate BMI.   BP Readings from Last 1 Encounters:   09/14/22 139/86      Pulse Readings from Last 1 Encounters:   09/14/22 120      Resp Readings from Last 1 Encounters:   09/14/22 18      Temp Readings from Last 1 Encounters:   09/14/22 98.4  F (36.9  C) (Oral)      SpO2 Readings from Last 1 Encounters:   09/14/22 99%      Wt Readings from Last 1 Encounters:   09/14/22 68 kg (149 lb 14.6 oz)      Ht Readings from Last 1 Encounters:   09/14/22 1.803 m (5' 11\")     GENERAL: young man in no distress; I can see from mid trunk up   SKIN: Visible skin clear. No significant rash, abnormal pigmentation or lesions.  EYES: Eyes grossly normal to inspection.    NECK: visible goiter is not present; no visible neck masses  RESP: No audible wheeze, cough, orincreased work of breathing.    NEURO: Awake, alert, responds appropriately to questions.  Mentation and speech fluent.  PSYCH:affect normal and appearance well-groomed.    DATA REVIEW     Latest Ref Rng 9/14/2022  4:40 PM 9/14/2022  9:40 PM 10/24/2022  10:57 AM   ENDO THYROID LABS-[facility]       TSH 0.30 - 4.20 uIU/mL   <0.01 (L)    Triiodothyronine (T3) 85 - 202 ng/dL   164    FREE T4 0.90 - 1.70 ng/dL 5.97 (H)   1.93 (H)    Thyroid Stim Immunog <=1.3 TSI index  4.5 (H)        Latest Ref Rng 11/28/2022  11:02 AM 12/26/2022  10:24 AM 1/23/2023  11:07 AM   [facility] THYROID LAB[facility]       TSH 0.30 - 4.20 uIU/mL <0.01 (L)  0.02 (L)  0.07 (L)    Triiodothyronine (T3) 85 - 202 ng/dL 128  90  93  "   FREE T4 0.90 - 1.70 ng/dL 1.50  1.25  1.18    Thyroid Stim Immunog <=1.3 TSI index         Latest Ref Rng 5/8/2023  3:46 PM   END[facility] THYROID LABS[facility]     TSH 0.30 - 4.20 uIU/mL 1.63    Triiodothyronine (T3) 85 - 202 ng/dL 95    FREE T4 0.90 - 1.70 ng/dL 1.37    Thyroid Stim Immunog <=1.3 TSI index       Legend:  (H) High  (L) Low

## 2023-05-15 NOTE — NURSING NOTE
Is the patient currently in the state of [place]? YES    Visit mode:VIDEO    If the visit is dropped, the patient can be reconnected by: VIDEO VISIT: Send to e-mail at: [email or web address]    Will anyone else be joining the visit? NO      How would you like to obtain your AVS? MyChart    Are changes needed to the allergy or medication list? YES: Pt states he is taking men's daily enzyme and probiotic.     Reason for visit: Video Visit

## 2023-05-15 NOTE — LETTER
5/15/2023       RE: Biju Chowdhury  [address] [place] [address]     Dear Colleague,    Thank you for referring your patient, Biju Chowdhury, to the [facility] [place] [facility]. Please see a copy of my visit note below.    Virtual Visit Details    Type of service:  Video Visit   Endocrine Video visit note-    Attending Assessment/Plan :     1. Graves' hyperthyroidism .  On  methimazole  10 mg once/day .  Next labs in July, 2023  Next TSI 9/2023 or after   He continues on propranolol and he is reporting 94 today on propranolol.  Propranolol may continue.     Goiter approx 2 times normal based on volume calculation from the 9/14 CT.  Current status unknown .     Need for dental work requiring general anesthesia - as above .   Letter for dental      Infertility; lost the insurance for this;     Due to the continued risks of COVID 19 this visit was a video visit. The patient gave verbal consent for the visit today.    I have independently reviewed and interpreted labs, imaging as indicated.     Distant Location (provider location):  Off-site  Mode of Communication:  Video Conference mere Weber  Chart review/prep time 1  [id number]  Visit Start time  1738   Visit Stop time  1752   __ minutes spent on the date of the encounter doing chart review, history and exam, documentation and further activities as noted above.    Adrienne Jeter MD    Chief complaint: HISTORY OF PRESENT ILLNESS  Biju presents today, along with his wife,  for follow up of Graves' hyperthyroidism.  Abnormal TFTS were lst noted 6/29/2022   He has been on methimazole since September.  I last saw him 1/23/23.  At that time he was on  Methimazole 10 mg bid and we reduced to 10 mg/day.  Today he confirms he is taking MMI 10 mg/day.  We had been measuring TFTS monthly.  However, There was a gap between the TFTS in January and May due to an insurance  "problem.  He still hasn't had the dental work, they have been waiting for clearance from me.     Today he reports that he feels back to normal.     Endocrine relevant labs are as follows:  4/10/18 semen analysis: 4.1 ml, pH 7.6, 6.4 mil/ml (> 15 mil/ml), motility 62% (> 40%), progressive motility 60% (> 32%), morphology normal forms 0% (> 4%), WBC < 1 mil/ml (< 1)  4/19/18 FSH 3, free testosterone 10.07, testosterone 610  6/29/2022 TSH < 0.01, free T4 1.6 ng/dl,  free T3 5.2 pg/ml  9/14/2022 TSH < 0.01, free T4 5.97, TSI 4.5, , AST 46, bili 0.4, Ca 10.4, creatinine 1.04,   10/24/2022 TSH < 0.01, free T4 1.93, T3 164 - on MMI 10 mg bid;  Reduce to 10 mg/day  11/28/22 TSH < 0.01, free T4 1.5, T3 128 on MMI 10 mg/day  12/26/22 TSH 0.02, free T4 1.25, T3 90 on MMI 10 mg/day.  1/23/23 results followed appt TSH 0.07, free t4 1.18, T3 93   5/8/23 TSH 1.63, free T4 1.37, T3 95     Relevant imaging is as follows: (as read by me as it pertains to endocrine relevant organs)  9/14/2022 CT chest PE study with contrast:  Thyroid is normal to generous size ; it has some subtle heterogeneity  Right lobe 2.2 x 3.8 x 6.5  Left lobe 1.6 x 3.7 x 5.8   Isthmus 1.1   overall volume 47 ml    REVIEW OF SYSTEMS  Weight 170  Sleep at night is good - sleeping more ;   Appetite good; eats 1-2 times/day  Still hasn't had the dental work.    Eyes: negative   Cardiac: negative; HR 94/minute   Respiratory:   GI  Negative; periumbilical \"there\" , \"not pain\".  Has discussed with PCP     Past Medical History  Past Medical History:   Diagnosis Date    Left varicocele 2018    Oligospermia 2018    Teratospermia 2018    Tonsillar abscess 2008     Past Surgical History:   Procedure Laterality Date    INCISION AND DRAINAGE PERITONSILLAR ABSCESS  10/07/2008     Medications  Current Outpatient Medications   Medication Sig Dispense Refill    methimazole (TAPAZOLE) 10 MG tablet Take 1 tablet (10 mg) by mouth daily 30 tablet 4    propranolol (INDERAL) 60 " "MG tablet Take 1 tablet (60 mg) by mouth daily 30 tablet 3     Allergies  No Known Allergies    Family History  Family History   Problem Relation Age of Onset    Multiple Sclerosis Mother     Heart Disease Father     Multiple Sclerosis Maternal Uncle      Social History  Social History     Tobacco Use    Smoking status: Never    Smokeless tobacco: Never   Vaping Use    Vaping status: Never Used   Substance Use Topics    Alcohol use: No    Drug use: No       --  [employer] license.   ; Wife is 9 years older than him.    Dogs dutchess (white) and Daniels (boxer like hound not a boxer)    Bike ride to mothers house; sister in town; playing basketball ; email: [email or web address].    Insurance is in limbo -     Physical Exam  There is no height or weight on file to calculate BMI.   BP Readings from Last 1 Encounters:   09/14/22 139/86      Pulse Readings from Last 1 Encounters:   09/14/22 120      Resp Readings from Last 1 Encounters:   09/14/22 18      Temp Readings from Last 1 Encounters:   09/14/22 98.4  F (36.9  C) (Oral)      SpO2 Readings from Last 1 Encounters:   09/14/22 99%      Wt Readings from Last 1 Encounters:   09/14/22 68 kg (149 lb 14.6 oz)      Ht Readings from Last 1 Encounters:   09/14/22 1.803 m (5' 11\")     GENERAL: young man in no distress; I can see from mid trunk up   SKIN: Visible skin clear. No significant rash, abnormal pigmentation or lesions.  EYES: Eyes grossly normal to inspection.    NECK: visible goiter is not present; no visible neck masses  RESP: No audible wheeze, cough, orincreased work of breathing.    NEURO: Awake, alert, responds appropriately to questions.  Mentation and speech fluent.  PSYCH:affect normal and appearance well-groomed.    DATA REVIEW     Latest Ref Rng 9/14/2022  4:40 PM 9/14/2022  9:40 PM 10/24/2022  10:57 AM   ENDO THYROID LABS[facility]       TSH 0.30 - 4.20 uIU/mL   <0.01 (L)    Triiodothyronine (T3) 85 - 202 ng/dL   164    FREE T4 0.90 - " 1.70 ng/dL 5.97 (H)   1.93 (H)    Thyroid Stim Immunog <=1.3 TSI index  4.5 (H)        Latest Ref Rng 11/28/2022  11:02 AM 12/26/2022  10:24 AM 1/23/2023  11:07 AM   ENDO THYROID LABS[facility]       TSH 0.30 - 4.20 uIU/mL <0.01 (L)  0.02 (L)  0.07 (L)    Triiodothyronine (T3) 85 - 202 ng/dL 128  90  93    FREE T4 0.90 - 1.70 ng/dL 1.50  1.25  1.18    Thyroid Stim Immunog <=1.3 TSI index         Latest Ref Rng 5/8/2023  3:46 PM   ENDO THYROID LABS[facility]     TSH 0.30 - 4.20 uIU/mL 1.63    Triiodothyronine (T3) 85 - 202 ng/dL 95    FREE T4 0.90 - 1.70 ng/dL 1.37    Thyroid Stim Immunog <=1.3 TSI index       Legend:  (H) High  (L) Low

## 2023-05-22 ENCOUNTER — TELEPHONE (OUTPATIENT)
Dept: ENDOCRINOLOGY | Facility: CLINIC | Age: 38
End: 2023-05-22

## 2023-05-22 NOTE — TELEPHONE ENCOUNTER
Patient call:     Appointment type: return endocrine   Provider: Steffany   Return date: lv 5/15  Be seen between 6-12 months   Speciality phone number: [phone]  Additional appointment(s) needed: labs in July 2023   Additional notes: No Myc, LVM   To schedule follow up with Dr. Jeter in 6-12 months and labs in July 2023   Mitra Dow on 5/22/2023 at 10:42 AM

## 2023-07-11 ENCOUNTER — TELEPHONE (OUTPATIENT)
Dept: ENDOCRINOLOGY | Facility: CLINIC | Age: 38
End: 2023-07-11

## 2023-07-11 NOTE — TELEPHONE ENCOUNTER
Per liza -   Dr. Jeter saw this patient in May. Check out notes say to have labs done in July and follow up in 6-12 months. Instead patient scheduled as return into a new spot on Dr. Jeter's schedule on 7/31/2023.    Please call this patient: cancel his appointment in July and schedule him with a lab appointment in July (Dr. Story's notes say she will mail him the results) and then schedule a follow up appointment with her in 2024.     Called patient and left message with information and phone number to clinic and lab. Writer will also cancel appt on 7/31.